# Patient Record
Sex: MALE | Race: BLACK OR AFRICAN AMERICAN | NOT HISPANIC OR LATINO
[De-identification: names, ages, dates, MRNs, and addresses within clinical notes are randomized per-mention and may not be internally consistent; named-entity substitution may affect disease eponyms.]

---
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
## 2019-01-14 ENCOUNTER — APPOINTMENT (OUTPATIENT)
Dept: PEDIATRIC ADOLESCENT MEDICINE | Facility: HOSPITAL | Age: 19
End: 2019-01-14

## 2019-03-01 ENCOUNTER — OUTPATIENT (OUTPATIENT)
Dept: OUTPATIENT SERVICES | Facility: HOSPITAL | Age: 19
LOS: 1 days | End: 2019-03-01
Payer: MEDICAID

## 2019-03-01 ENCOUNTER — OUTPATIENT (OUTPATIENT)
Dept: OUTPATIENT SERVICES | Facility: HOSPITAL | Age: 19
LOS: 1 days | End: 2019-03-01

## 2019-03-01 PROCEDURE — G9001: CPT

## 2019-03-28 ENCOUNTER — APPOINTMENT (OUTPATIENT)
Dept: PEDIATRIC ADOLESCENT MEDICINE | Facility: HOSPITAL | Age: 19
End: 2019-03-28
Payer: MEDICAID

## 2019-03-28 ENCOUNTER — OUTPATIENT (OUTPATIENT)
Dept: OUTPATIENT SERVICES | Age: 19
LOS: 1 days | End: 2019-03-28

## 2019-03-28 VITALS — WEIGHT: 315 LBS | DIASTOLIC BLOOD PRESSURE: 74 MMHG | SYSTOLIC BLOOD PRESSURE: 145 MMHG | HEART RATE: 110 BPM

## 2019-03-28 VITALS — HEIGHT: 73.62 IN | WEIGHT: 315 LBS | BODY MASS INDEX: 40.86 KG/M2

## 2019-03-28 VITALS — SYSTOLIC BLOOD PRESSURE: 150 MMHG | DIASTOLIC BLOOD PRESSURE: 100 MMHG

## 2019-03-28 VITALS — SYSTOLIC BLOOD PRESSURE: 128 MMHG | DIASTOLIC BLOOD PRESSURE: 78 MMHG

## 2019-03-28 DIAGNOSIS — E66.9 OBESITY, UNSPECIFIED: ICD-10-CM

## 2019-03-28 DIAGNOSIS — Z86.39 PERSONAL HISTORY OF OTHER ENDOCRINE, NUTRITIONAL AND METABOLIC DISEASE: ICD-10-CM

## 2019-03-28 PROCEDURE — 99214 OFFICE O/P EST MOD 30 MIN: CPT

## 2019-03-29 DIAGNOSIS — Z71.89 OTHER SPECIFIED COUNSELING: ICD-10-CM

## 2019-03-29 PROBLEM — E66.9 OBESITY, CLASS II, BMI 35-39.9: Status: RESOLVED | Noted: 2017-11-13 | Resolved: 2019-03-29

## 2019-03-29 PROBLEM — Z86.39 HISTORY OF OBESITY: Status: RESOLVED | Noted: 2018-01-24 | Resolved: 2019-03-29

## 2019-03-29 PROBLEM — E66.9 OBESITY PEDS (BMI >=95 PERCENTILE): Status: RESOLVED | Noted: 2017-12-07 | Resolved: 2019-03-29

## 2019-04-08 ENCOUNTER — APPOINTMENT (OUTPATIENT)
Dept: SURGERY | Facility: CLINIC | Age: 19
End: 2019-04-08
Payer: MEDICAID

## 2019-04-08 VITALS
DIASTOLIC BLOOD PRESSURE: 88 MMHG | TEMPERATURE: 98.8 F | SYSTOLIC BLOOD PRESSURE: 144 MMHG | RESPIRATION RATE: 16 BRPM | OXYGEN SATURATION: 97 % | HEART RATE: 79 BPM | BODY MASS INDEX: 41.75 KG/M2 | HEIGHT: 73 IN | WEIGHT: 315 LBS

## 2019-04-08 PROCEDURE — 99214 OFFICE O/P EST MOD 30 MIN: CPT

## 2019-04-08 RX ORDER — KETOCONAZOLE 20.5 MG/ML
2 SHAMPOO, SUSPENSION TOPICAL DAILY
Qty: 1 | Refills: 1 | Status: DISCONTINUED | COMMUNITY
Start: 2017-12-07 | End: 2019-04-08

## 2019-04-08 NOTE — CONSULT LETTER
[Dear  ___] : Dear  [unfilled], [Consult Letter:] : I had the pleasure of evaluating your patient, [unfilled]. [Please see my note below.] : Please see my note below. [Consult Closing:] : Thank you very much for allowing me to participate in the care of this patient.  If you have any questions, please do not hesitate to contact me. [Sincerely,] : Sincerely, [FreeTextEntry3] : Alec Lanier MD, FACS, FASMBS\par Chief Division of Minimally Invasive Surgery\par Co-Director of Bariatric Surgery \par [facility]\[place] [address]

## 2019-04-08 NOTE — ASSESSMENT
[FreeTextEntry1] : 18-year-old male, here for evaluation for a sleeve gastrectomy. We have discussed the procedure. But he is once again, nervous about the operation, and does not think he wants to proceed. I discussed with him the need for surgery given his weight. I have offered to see him at any time when he finally decided that he would like to proceed. All of his questions were answered.

## 2019-04-08 NOTE — HISTORY OF PRESENT ILLNESS
[de-identified] : Boris is a 19 y/o male here for re-consult for weight loss surgery. 18-year-old male, here for evaluation for a sleeve gastrectomy. He's been heavy most of his adult life. He's been on multiple diet programs in the past, losing up to 20 pounds at any one time. He is looking for more permanent solution to his weight loss. Problem. His extended in a sleeve gastrectomy. He was seen over a year ago for the same but canceled at the last minute.

## 2019-04-08 NOTE — PHYSICAL EXAM
[Obese, well nourished, in no acute distress] : obese, well nourished, in no acute distress [Normal] : affect appropriate [de-identified] : Wears glasses [de-identified] : Resp. Effort

## 2019-04-08 NOTE — REASON FOR VISIT
[Morbid Obesity (BMI>40)] : morbid obesity (bmi>40) [FreeTextEntry2] : chief complaint morbid obesity

## 2019-05-23 ENCOUNTER — APPOINTMENT (OUTPATIENT)
Dept: PEDIATRIC ADOLESCENT MEDICINE | Facility: HOSPITAL | Age: 19
End: 2019-05-23

## 2019-06-04 ENCOUNTER — APPOINTMENT (OUTPATIENT)
Dept: PEDIATRIC ADOLESCENT MEDICINE | Facility: HOSPITAL | Age: 19
End: 2019-06-04
Payer: MEDICAID

## 2019-06-04 VITALS
HEIGHT: 73.23 IN | BODY MASS INDEX: 41.3 KG/M2 | SYSTOLIC BLOOD PRESSURE: 131 MMHG | WEIGHT: 315 LBS | HEART RATE: 71 BPM | DIASTOLIC BLOOD PRESSURE: 68 MMHG

## 2019-06-04 PROCEDURE — 99395 PREV VISIT EST AGE 18-39: CPT

## 2019-06-04 NOTE — PHYSICAL EXAM
[Alert] : alert [No Acute Distress] : no acute distress [Normocephalic] : normocephalic [Clear tympanic membranes with bony landmarks and light reflex present bilaterally] : clear tympanic membranes with bony landmarks and light reflex present bilaterally  [EOMI Bilateral] : EOMI bilateral [Pink Nasal Mucosa] : pink nasal mucosa [Nonerythematous Oropharynx] : nonerythematous oropharynx [Supple, full passive range of motion] : supple, full passive range of motion [Clear to Ausculatation Bilaterally] : clear to auscultation bilaterally [No Palpable Masses] : no palpable masses [Regular Rate and Rhythm] : regular rate and rhythm [Normal S1, S2 audible] : normal S1, S2 audible [No Murmurs] : no murmurs [+2 Femoral Pulses] : +2 femoral pulses [Soft] : soft [NonTender] : non tender [Non Distended] : non distended [No Hepatomegaly] : no hepatomegaly [Normoactive Bowel Sounds] : normoactive bowel sounds [No Splenomegaly] : no splenomegaly [Circumcised] : circumcised [Bilateral descended testes] : bilateral descended testes [No Testicular Masses] : no testicular masses [Normal Muscle Tone] : normal muscle tone [No Abnormal Lymph Nodes Palpated] : no abnormal lymph nodes palpated [No Gait Asymmetry] : no gait asymmetry [No pain or deformities with palpation of bone, muscles, joints] : no pain or deformities with palpation of bone, muscles, joints [Straight] : straight [+2 Patella DTR] : +2 patella DTR [Cranial Nerves Grossly Intact] : cranial nerves grossly intact [de-identified] : hyperpigmented plaque on neck and body folds

## 2019-06-04 NOTE — HISTORY OF PRESENT ILLNESS
[Yes] : Patient goes to dentist yearly [Eats meals with family] : eats meals with family [Exposure to drugs] : exposure to drugs [Exposure to alcohol] : exposure to alcohol [No] : No cigarette smoke exposure [Has peer relationships free of violence] : has peer relationships free of violence [With Teen] : teen [Has friends] : has friends [At least 1 hour of physical activity a day] : at least 1 hour of physical activity a day [Needs Immunizations] : needs immunizations [Drinks non-sweetened liquids] : does not drink non-sweetened liquids  [Eats regular meals including adequate fruits and vegetables] : does not eat regular meals including adequate fruits and vegetables [Exposure to electronic nicotine delivery system] : no exposure to electronic nicotine delivery system [Uses electronic nicotine delivery system] : does not use electronic nicotine delivery system [Uses tobacco] : does not use tobacco [Exposure to tobacco] : no exposure to tobacco [Uses drugs] : does not use drugs  [Uses safety belts/safety equipment] : does not use safety belts/safety equipment  [Drinks alcohol] : does not drink alcohol [Has problems with sleep] : does not have problems with sleep [Has thought about hurting self or considered suicide] : has not thought about hurting self or considered suicide [Gets depressed, anxious, or irritable/has mood swings] : does not get depressed, anxious, or irritable/has mood swings [de-identified] : need TB screening for work [FreeTextEntry7] : waiting for new glasses [de-identified] : mother declined on vaccines today: HPV and Hep A series [de-identified] : Starting this fall at [employer] in [place] [de-identified] : live with mother, mother does not always cook dinner [FreeTextEntry1] : Boris is a 18 year old male with history acanthosis, nigricans, hypertension and vitamin D deficiency here for annual PE and TB screening for working papers.  \par \par Evaluated by Bariatric, patient decided not to move forward with surgery \par Since last visit with [facility], he has not been eating "outside food" as much however has not made followup appointment with [facility] or therapist. He reports mother is trying to get him to see a therapist near his house but refuses to go\par \par Total lifetime partners:  4 females\par Current partners: 0\par Last SA:  1 month ago, receptive oral sex \par Denies STI history. HIV tested a year ago\par Denies any painful urination, penile discharge or lesions/mass\par \par  [de-identified] : plays basketball with friends, starting work with disabled adult

## 2019-06-04 NOTE — DISCUSSION/SUMMARY
[FreeTextEntry1] : Boris is a 18 year old male with history acanthosis, nigricans, hypertension and vitamin D deficiency here for annual PE and TB screening for working papers. Mother not present during visit today, reviewed labs with patient: Vit D 12, triglyceride 140, HgbA1c 5.8 (copy of lab results provided for mother)\par \par Plan:\par - Start Vit D 50,000iu Q week x 8 weeks\par - Mother declined HPV and Hep A series today\par - Declined STI testing \par - Lab: Quant TB for work\par - Encouraged to make FU appointment with [employer]. Discussed eliminating sugary drinks, snack, junk food and limit CHO replace with grains/complex CHO\par - Encourage to return for therapy with [place] or with local mental health \par - FU annually for PE

## 2019-06-06 LAB
M TB IFN-G BLD-IMP: NEGATIVE
QUANTIFERON TB PLUS MITOGEN MINUS NIL: 8.8 IU/ML
QUANTIFERON TB PLUS NIL: 0.01 IU/ML
QUANTIFERON TB PLUS TB1 MINUS NIL: 0 IU/ML
QUANTIFERON TB PLUS TB2 MINUS NIL: 0.01 IU/ML

## 2020-11-10 ENCOUNTER — APPOINTMENT (OUTPATIENT)
Dept: PEDIATRIC ADOLESCENT MEDICINE | Facility: HOSPITAL | Age: 20
End: 2020-11-10
Payer: MEDICAID

## 2020-11-10 VITALS
DIASTOLIC BLOOD PRESSURE: 81 MMHG | WEIGHT: 315 LBS | HEIGHT: 73.43 IN | HEART RATE: 72 BPM | BODY MASS INDEX: 40.86 KG/M2 | SYSTOLIC BLOOD PRESSURE: 134 MMHG

## 2020-11-10 DIAGNOSIS — L83 ACANTHOSIS NIGRICANS: ICD-10-CM

## 2020-11-10 PROCEDURE — 99395 PREV VISIT EST AGE 18-39: CPT | Mod: 25

## 2020-11-10 PROCEDURE — 81003 URINALYSIS AUTO W/O SCOPE: CPT | Mod: QW

## 2020-11-10 PROCEDURE — 96160 PT-FOCUSED HLTH RISK ASSMT: CPT

## 2020-11-10 NOTE — DISCUSSION/SUMMARY
[FreeTextEntry1] : Boris is a 18yo male with history of depression, morbid obesity and vitamin D deficiency here for annual PE. \par \par BMI 50.5/>99%\par POCT UA: glucose and ketone neg\par \par Plan:\par - Declined flu vaccine\par - Fasting lab today: CBC, CMP, lipid panel, A1c, HIV, syphilis, GC/CL, COVID antibody\par - Discussed 5-2-1-0, healthier food choices, avoid sugar drinks, fast food or take out, increase physical activity/limit sedentary lifestyle and screen time\par - Refer to Pulmonology for snoring vs FARIDEH\par - Refer to Cardiology for chest pain and morbid obesity\par - Refer Orthopedic for left shoulder injury\par - FU annually for PE, sooner pending lab\par \par

## 2020-11-10 NOTE — HISTORY OF PRESENT ILLNESS
[Eats meals with family] : eats meals with family [Eats regular meals including adequate fruits and vegetables] : eats regular meals including adequate fruits and vegetables [Drinks non-sweetened liquids] : drinks non-sweetened liquids  [Has friends] : has friends [At least 1 hour of physical activity a day] : at least 1 hour of physical activity a day [Uses drugs] : uses drugs  [Drinks alcohol] : drinks alcohol [No] : No cigarette smoke exposure [Uses safety belts/safety equipment] : uses safety belts/safety equipment  [Has peer relationships free of violence] : has peer relationships free of violence [Has family members/adults to turn to for help] : has family members/adults to turn to for help [Is permitted and is able to make independent decisions] : Is permitted and is able to make independent decisions [Has interests/participates in community activities/volunteers] : has interests/participates in community activities/volunteers. [Yes] : Patient has had sexual intercourse. [Has ways to cope with stress] : has ways to cope with stress [Displays self-confidence] : displays self-confidence [Has problems with sleep] : has problems with sleep [With Teen] : teen [Sleep Concerns] : no sleep concerns [Uses electronic nicotine delivery system] : does not use electronic nicotine delivery system [Exposure to electronic nicotine delivery system] : no exposure to electronic nicotine delivery system [Uses tobacco] : does not use tobacco [Exposure to tobacco] : no exposure to tobacco [Exposure to drugs] : no exposure to drugs [Exposure to alcohol] : no exposure to alcohol [Impaired/distracted driving] : no impaired/distracted driving [Gets depressed, anxious, or irritable/has mood swings] : does not get depressed, anxious, or irritable/has mood swings [Has thought about hurting self or considered suicide] : has not thought about hurting self or considered suicide [de-identified] : see HPI [de-identified] : declined flu vaccine [de-identified] : lives mother and brother and sister [de-identified] : [employer] - wants to study criminal justice  [de-identified] : has a  license, works [employer]  [de-identified] : smokes marijuana and drinks sometimes but does not drive while impaired [FreeTextEntry1] : Boris is a 18yo male with history of depression, morbid obesity and vitamin D deficiency here for annual PE. \par \par Since last PE, a year ago, denies hospitalizations, COVID exposure/symptoms, travel outside [facility]/[place].\par \par 11/7/2020 went to [facility] for left chest pain EKG reported normal and CXR normal (see scanned report), and was recommend cardiology evaluation. Patient report on 11/6/2020 while playing basketball he reached out to get the ball and heard a sound in his left shoulder with radiating pain to neck, chest and back pain pain scale 5-6/10. Now has pain left side and shoulder when he moves his left arm. Mother is giving him pain medicine but does not remember the name, icing and sleeping in recliner chair due to pain. \par \par Admits has not been taking vitamin D supplements because he did not know his level is low. Currently taking MVI chewable \par Decided not to have bariatric surgery \par Stopped attending weight management [facility] - did not feel it was helpful. He admits that he gain more weight in the summer after COVID, highest weight 397lbs in 6/2020 but has been trying to work out daily at home and eating more vegetables and protein. He allows himself to have 1 "cheat day" \par Still snores a lot at night \par Attended therapy for 3 months but told by therapist he does not need it \par \par Total lifetime partners: 3 female \par Current partners: 0\par Last SA:  months ago \par STI history - HIV 1-2 years ago negative\par Denies any painful urination, penile discharge or lesions/mass

## 2020-11-10 NOTE — PHYSICAL EXAM

## 2020-11-11 LAB
25(OH)D3 SERPL-MCNC: 22.9 NG/ML
ALBUMIN SERPL ELPH-MCNC: 4.8 G/DL
ALP BLD-CCNC: 85 U/L
ALT SERPL-CCNC: 18 U/L
ANION GAP SERPL CALC-SCNC: 17 MMOL/L
AST SERPL-CCNC: 16 U/L
BASOPHILS # BLD AUTO: 0.02 K/UL
BASOPHILS NFR BLD AUTO: 0.3 %
BILIRUB SERPL-MCNC: 0.4 MG/DL
BUN SERPL-MCNC: 11 MG/DL
CALCIUM SERPL-MCNC: 9.9 MG/DL
CHLORIDE SERPL-SCNC: 98 MMOL/L
CHOLEST SERPL-MCNC: 137 MG/DL
CO2 SERPL-SCNC: 25 MMOL/L
CREAT SERPL-MCNC: 1.14 MG/DL
EOSINOPHIL # BLD AUTO: 0.19 K/UL
EOSINOPHIL NFR BLD AUTO: 2.5 %
ESTIMATED AVERAGE GLUCOSE: 117 MG/DL
GLUCOSE SERPL-MCNC: 84 MG/DL
HBA1C MFR BLD HPLC: 5.7 %
HCT VFR BLD CALC: 48.2 %
HDLC SERPL-MCNC: 41 MG/DL
HGB BLD-MCNC: 15.7 G/DL
HIV1+2 AB SPEC QL IA.RAPID: NONREACTIVE
IMM GRANULOCYTES NFR BLD AUTO: 0.4 %
LDLC SERPL CALC-MCNC: 69 MG/DL
LYMPHOCYTES # BLD AUTO: 2.37 K/UL
LYMPHOCYTES NFR BLD AUTO: 30.6 %
MAN DIFF?: NORMAL
MCHC RBC-ENTMCNC: 27.1 PG
MCHC RBC-ENTMCNC: 32.6 GM/DL
MCV RBC AUTO: 83.2 FL
MONOCYTES # BLD AUTO: 0.38 K/UL
MONOCYTES NFR BLD AUTO: 4.9 %
NEUTROPHILS # BLD AUTO: 4.76 K/UL
NEUTROPHILS NFR BLD AUTO: 61.3 %
NONHDLC SERPL-MCNC: 97 MG/DL
PLATELET # BLD AUTO: 248 K/UL
POTASSIUM SERPL-SCNC: 4.2 MMOL/L
PROT SERPL-MCNC: 7 G/DL
RBC # BLD: 5.79 M/UL
RBC # FLD: 13.8 %
SARS-COV-2 IGG SERPL IA-ACNC: 1.65 INDEX
SARS-COV-2 IGG SERPL QL IA: POSITIVE
SODIUM SERPL-SCNC: 140 MMOL/L
TRIGL SERPL-MCNC: 138 MG/DL
WBC # FLD AUTO: 7.75 K/UL

## 2020-11-11 RX ORDER — CHOLECALCIFEROL (VITAMIN D3) 1250 MCG
1.25 MG CAPSULE ORAL
Qty: 8 | Refills: 0 | Status: DISCONTINUED | COMMUNITY
Start: 2019-06-04 | End: 2020-11-11

## 2020-11-12 LAB
C TRACH RRNA SPEC QL NAA+PROBE: NOT DETECTED
N GONORRHOEA RRNA SPEC QL NAA+PROBE: NOT DETECTED
SOURCE AMPLIFICATION: NORMAL
T PALLIDUM AB SER QL IA: NEGATIVE

## 2020-11-17 ENCOUNTER — APPOINTMENT (OUTPATIENT)
Dept: ORTHOPEDIC SURGERY | Facility: CLINIC | Age: 20
End: 2020-11-17
Payer: MEDICAID

## 2020-11-17 PROCEDURE — 73030 X-RAY EXAM OF SHOULDER: CPT | Mod: LT

## 2020-11-17 PROCEDURE — 99203 OFFICE O/P NEW LOW 30 MIN: CPT

## 2020-11-17 NOTE — PHYSICAL EXAM
[de-identified] : Oriented to time, place, person\par Mood: Normal\par Affect: Normal\par Appearance: Obese, no acute distress.\par Gait: Normal\par Assistive Devices: None \par \par Left shoulder exam:\par \par Inspection: No malalignment, No gross deformity to the sternal deformity or pectoral tendon deformity, No atrophy\par Skin: No masses, No lesions\par Neck: Negative Spurling, full ROM, no pain with ROM\par AROM: FF to 180, abduction to 90, ER to 90, IR to mid lumbar\par Painful arc ROM: IR\par Tenderness:+ sternal clavicular joint pain. No bicipital tenderness, no tenderness to greater tuberosity/RTC insertion, no anterior shoulder/lesser tuberosity tenderness.  No tenderness to palpation to thePectoralis tendon insertion.\par Strength: 5/5 ER, 5/5 IR in adduction , 5/5 supraspinatus testing, negative Ozark's test\par AC joint: No TTP/pain with cross arm testing\par Biceps: Speed Negative, Yergason Negative \par Impingement test: Negative Hdez, Negative Neer\par Vasc: 2+ radial pulse \par Stability: Stable \par Neuro: AIN, PIN, Ulnar nerve intact to motor\par Sensation: Intact to light touch throughout  [de-identified] : The following radiographs were ordered and read by me during this patients visit. I reviewed each radiograph in detail with the patient and discussed the findings as highlighted below.\par \par 3 views of left shoulder were obtained today, 11/17/2020, that show no acute fracture or dislocation. There is no glenohumeral and no AC joint degenerative change seen. Type I acromion. There is no significant malalignment. No significant other obvious osseous abnormality, otherwise unremarkable.

## 2020-11-17 NOTE — DISCUSSION/SUMMARY
[de-identified] : 20 y/o male with left shoulder pain. \par \par Presentation is likely consistent with minor pectoralis muscle strain. Clinically, there is no gross deformity to the sternal deformity or pectoral tendon deformity at the humeral attachment.  Patient also has some discomfort at the sternoclavicular joint on the left side.  Concern for possible subluxation with local irritation of the chest wall/pectoralis attachment.  Considering mild injury, without sofya rupture, recommendation is for physical therapy for strengthening and conditioning. \par \par Recommendations: PT Rx given. Conservative care & observation, this includes rest/activity avoidance until less symptomatic with subsequent gradual return to full activity as tolerated. Patient may also use OTC NSAID's or acetaminophen as tolerated.\par \par Follow-up as needed.

## 2020-11-17 NOTE — HISTORY OF PRESENT ILLNESS
[de-identified] : 19 year old RHD male presents today with left shoulder and chest pain since 11/6/2020. He was playing basketball reached out for a rebound felt a tear in his chest and pain in his shoulder. The pain has improved is brought on with certain movements and leaning on it. Denies numbness or tingling.  Denies any sofya deformity to the left upper extremity.\par \par The patient's past medical history, past surgical history, medications and allergies were reviewed by me today with the patient and documented accordingly. In addition, the patient's family and social history, which were noncontributory to this visit, were reviewed also.

## 2020-11-17 NOTE — ADDENDUM
[FreeTextEntry1] : This note was written by Mariam Clements on 11/17/2020 acting solely as a scribe for Dr. Dangelo Hawkins.\par \par All medical record entries made by the Scribe were at my, Dr. Dangelo Hawkins, direction and personally dictated by me on 11/17/2020. I have personally reviewed the chart and agree that the record accurately reflects my personal performance of the history, physical exam, assessment and plan.

## 2020-11-18 ENCOUNTER — APPOINTMENT (OUTPATIENT)
Dept: PULMONOLOGY | Facility: CLINIC | Age: 20
End: 2020-11-18

## 2020-11-19 ENCOUNTER — APPOINTMENT (OUTPATIENT)
Dept: CARDIOLOGY | Facility: HOSPITAL | Age: 20
End: 2020-11-19

## 2020-11-19 ENCOUNTER — NON-APPOINTMENT (OUTPATIENT)
Age: 20
End: 2020-11-19

## 2020-11-19 VITALS
SYSTOLIC BLOOD PRESSURE: 147 MMHG | OXYGEN SATURATION: 99 % | HEART RATE: 73 BPM | WEIGHT: 315 LBS | HEIGHT: 73.5 IN | BODY MASS INDEX: 40.86 KG/M2 | RESPIRATION RATE: 16 BRPM | DIASTOLIC BLOOD PRESSURE: 91 MMHG

## 2020-11-19 NOTE — HISTORY OF PRESENT ILLNESS
[FreeTextEntry1] : 20 yo man with morbid obesity BMI 49 who is here with L chest pain and L shoulder pain. Pain began abruptly when he was playing basketball and was reaching for the ball. He felt a tearing sensation and since then has been having sharp and aching chest and L shoulder pain, which has been progressively improving. No other symptoms.

## 2020-11-19 NOTE — DISCUSSION/SUMMARY
[FreeTextEntry1] : 20 yo man with morbid obesity BMI 49 who is here with L chest pain and L shoulder pain. Pain began abruptly when he was playing basketball and was reaching for the ball. He felt a tearing sensation and since then has been having sharp and aching chest and L shoulder pain, which has been progressively improving. No other symptoms. \par \par #Reproducible chest pain with inciting injury\par -- Musculoskeletal pain, no cardiac workup needed\par -- EKG reviewed, no ST changes, T wave changes, or pathologic Q waves, or no other significant abnormalities\par \par Discussed with Dr. Nolen\par \par Signed by\par Allen Houston MD\par PGY4 Cardiology

## 2020-11-19 NOTE — PHYSICAL EXAM
[General Appearance - Well Developed] : well developed [Normal Appearance] : normal appearance [Well Groomed] : well groomed [General Appearance - Well Nourished] : well nourished [No Deformities] : no deformities [General Appearance - In No Acute Distress] : no acute distress [Normal Conjunctiva] : the conjunctiva exhibited no abnormalities [Eyelids - No Xanthelasma] : the eyelids demonstrated no xanthelasmas [Normal Oral Mucosa] : normal oral mucosa [No Oral Pallor] : no oral pallor [No Oral Cyanosis] : no oral cyanosis [Normal Jugular Venous A Waves Present] : normal jugular venous A waves present [Normal Jugular Venous V Waves Present] : normal jugular venous V waves present [No Jugular Venous Landry A Waves] : no jugular venous landry A waves [Respiration, Rhythm And Depth] : normal respiratory rhythm and effort [Exaggerated Use Of Accessory Muscles For Inspiration] : no accessory muscle use [Auscultation Breath Sounds / Voice Sounds] : lungs were clear to auscultation bilaterally [Heart Rate And Rhythm] : heart rate and rhythm were normal [Heart Sounds] : normal S1 and S2 [Murmurs] : no murmurs present [Abdomen Soft] : soft [Abdomen Tenderness] : non-tender [Abdomen Mass (___ Cm)] : no abdominal mass palpated [Abnormal Walk] : normal gait [Gait - Sufficient For Exercise Testing] : the gait was sufficient for exercise testing [Nail Clubbing] : no clubbing of the fingernails [Cyanosis, Localized] : no localized cyanosis [Petechial Hemorrhages (___cm)] : no petechial hemorrhages [Skin Color & Pigmentation] : normal skin color and pigmentation [] : no rash [No Venous Stasis] : no venous stasis [Skin Lesions] : no skin lesions [No Skin Ulcers] : no skin ulcer [No Xanthoma] : no  xanthoma was observed [Oriented To Time, Place, And Person] : oriented to person, place, and time [Affect] : the affect was normal [Mood] : the mood was normal [No Anxiety] : not feeling anxious [FreeTextEntry1] : reproducible chest pain

## 2020-12-14 ENCOUNTER — MED ADMIN CHARGE (OUTPATIENT)
Age: 20
End: 2020-12-14

## 2020-12-14 ENCOUNTER — APPOINTMENT (OUTPATIENT)
Dept: PEDIATRIC ADOLESCENT MEDICINE | Facility: HOSPITAL | Age: 20
End: 2020-12-14
Payer: MEDICAID

## 2020-12-14 ENCOUNTER — OUTPATIENT (OUTPATIENT)
Dept: OUTPATIENT SERVICES | Age: 20
LOS: 1 days | End: 2020-12-14

## 2020-12-14 PROCEDURE — 90715 TDAP VACCINE 7 YRS/> IM: CPT | Mod: SL

## 2020-12-14 PROCEDURE — 90472 IMMUNIZATION ADMIN EACH ADD: CPT | Mod: SL

## 2020-12-14 PROCEDURE — 90632 HEPA VACCINE ADULT IM: CPT | Mod: SL

## 2020-12-14 PROCEDURE — 90471 IMMUNIZATION ADMIN: CPT

## 2020-12-14 NOTE — HISTORY OF PRESENT ILLNESS
[Hepatitis A] : Hepatitis A [Tdap] : Tdap [FreeTextEntry1] : HAV #1 and Tdap booster today\par \[facility]\par Pt report he will be living on campus dorm once allowed in spring semester \par - Recommend Bexero is highly recommended

## 2022-03-31 NOTE — HISTORY OF PRESENT ILLNESS
[de-identified] : 21M PMH class III obesity, HTN, depression\par who presents as a new patient for annual physical.\par \par PMH:\par Meds:\par Allergies:\par Surgeries:\par FHx:\par Social Hx: family, work, substance\par \par Immunizations: ?COVID, ?Flu, Tdap (12/2020)\par Depression:\par DAST:\par Labs - CBC, CMP, Lipid, A1c, ?Vit D, ?TSH\par HIV?\par \par Obesity Plan:\par

## 2022-04-01 ENCOUNTER — APPOINTMENT (OUTPATIENT)
Dept: INTERNAL MEDICINE | Facility: CLINIC | Age: 22
End: 2022-04-01

## 2022-07-17 ENCOUNTER — EMERGENCY (EMERGENCY)
Facility: HOSPITAL | Age: 22
LOS: 0 days | Discharge: HOME | End: 2022-07-17
Attending: STUDENT IN AN ORGANIZED HEALTH CARE EDUCATION/TRAINING PROGRAM

## 2022-07-17 VITALS
RESPIRATION RATE: 20 BRPM | TEMPERATURE: 98 F | DIASTOLIC BLOOD PRESSURE: 81 MMHG | HEIGHT: 74 IN | SYSTOLIC BLOOD PRESSURE: 125 MMHG | HEART RATE: 67 BPM | WEIGHT: 315 LBS | OXYGEN SATURATION: 99 %

## 2022-07-17 VITALS
SYSTOLIC BLOOD PRESSURE: 132 MMHG | RESPIRATION RATE: 14 BRPM | DIASTOLIC BLOOD PRESSURE: 58 MMHG | TEMPERATURE: 98 F | HEART RATE: 75 BPM | OXYGEN SATURATION: 100 %

## 2022-07-17 DIAGNOSIS — E66.9 OBESITY, UNSPECIFIED: ICD-10-CM

## 2022-07-17 DIAGNOSIS — R07.89 OTHER CHEST PAIN: ICD-10-CM

## 2022-07-17 DIAGNOSIS — F17.210 NICOTINE DEPENDENCE, CIGARETTES, UNCOMPLICATED: ICD-10-CM

## 2022-07-17 LAB
ALBUMIN SERPL ELPH-MCNC: 3.7 G/DL — SIGNIFICANT CHANGE UP (ref 3.3–5)
ALP SERPL-CCNC: 76 U/L — SIGNIFICANT CHANGE UP (ref 40–120)
ALT FLD-CCNC: 31 U/L — SIGNIFICANT CHANGE UP (ref 12–78)
ANION GAP SERPL CALC-SCNC: 8 MMOL/L — SIGNIFICANT CHANGE UP (ref 5–17)
AST SERPL-CCNC: 22 U/L — SIGNIFICANT CHANGE UP (ref 15–37)
BASOPHILS # BLD AUTO: 0.03 K/UL — SIGNIFICANT CHANGE UP (ref 0–0.2)
BASOPHILS NFR BLD AUTO: 0.4 % — SIGNIFICANT CHANGE UP (ref 0–2)
BILIRUB SERPL-MCNC: 0.3 MG/DL — SIGNIFICANT CHANGE UP (ref 0.2–1.2)
BUN SERPL-MCNC: 12 MG/DL — SIGNIFICANT CHANGE UP (ref 7–23)
CALCIUM SERPL-MCNC: 9 MG/DL — SIGNIFICANT CHANGE UP (ref 8.5–10.1)
CHLORIDE SERPL-SCNC: 108 MMOL/L — SIGNIFICANT CHANGE UP (ref 96–108)
CO2 SERPL-SCNC: 26 MMOL/L — SIGNIFICANT CHANGE UP (ref 22–31)
CREAT SERPL-MCNC: 1.08 MG/DL — SIGNIFICANT CHANGE UP (ref 0.5–1.3)
EGFR: 100 ML/MIN/1.73M2 — SIGNIFICANT CHANGE UP
EOSINOPHIL # BLD AUTO: 0.14 K/UL — SIGNIFICANT CHANGE UP (ref 0–0.5)
EOSINOPHIL NFR BLD AUTO: 1.9 % — SIGNIFICANT CHANGE UP (ref 0–6)
GLUCOSE SERPL-MCNC: 93 MG/DL — SIGNIFICANT CHANGE UP (ref 70–99)
HCT VFR BLD CALC: 46.8 % — SIGNIFICANT CHANGE UP (ref 39–50)
HGB BLD-MCNC: 15.8 G/DL — SIGNIFICANT CHANGE UP (ref 13–17)
IMM GRANULOCYTES NFR BLD AUTO: 0.3 % — SIGNIFICANT CHANGE UP (ref 0–1.5)
LYMPHOCYTES # BLD AUTO: 2.2 K/UL — SIGNIFICANT CHANGE UP (ref 1–3.3)
LYMPHOCYTES # BLD AUTO: 29.5 % — SIGNIFICANT CHANGE UP (ref 13–44)
MCHC RBC-ENTMCNC: 27.2 PG — SIGNIFICANT CHANGE UP (ref 27–34)
MCHC RBC-ENTMCNC: 33.8 G/DL — SIGNIFICANT CHANGE UP (ref 32–36)
MCV RBC AUTO: 80.7 FL — SIGNIFICANT CHANGE UP (ref 80–100)
MONOCYTES # BLD AUTO: 0.31 K/UL — SIGNIFICANT CHANGE UP (ref 0–0.9)
MONOCYTES NFR BLD AUTO: 4.2 % — SIGNIFICANT CHANGE UP (ref 2–14)
NEUTROPHILS # BLD AUTO: 4.75 K/UL — SIGNIFICANT CHANGE UP (ref 1.8–7.4)
NEUTROPHILS NFR BLD AUTO: 63.7 % — SIGNIFICANT CHANGE UP (ref 43–77)
NRBC # BLD: 0 /100 WBCS — SIGNIFICANT CHANGE UP (ref 0–0)
PLATELET # BLD AUTO: 219 K/UL — SIGNIFICANT CHANGE UP (ref 150–400)
POTASSIUM SERPL-MCNC: 3.7 MMOL/L — SIGNIFICANT CHANGE UP (ref 3.5–5.3)
POTASSIUM SERPL-SCNC: 3.7 MMOL/L — SIGNIFICANT CHANGE UP (ref 3.5–5.3)
PROT SERPL-MCNC: 7.1 GM/DL — SIGNIFICANT CHANGE UP (ref 6–8.3)
RBC # BLD: 5.8 M/UL — SIGNIFICANT CHANGE UP (ref 4.2–5.8)
RBC # FLD: 14 % — SIGNIFICANT CHANGE UP (ref 10.3–14.5)
SODIUM SERPL-SCNC: 142 MMOL/L — SIGNIFICANT CHANGE UP (ref 135–145)
TROPONIN I, HIGH SENSITIVITY RESULT: 5.4 NG/L — SIGNIFICANT CHANGE UP
WBC # BLD: 7.45 K/UL — SIGNIFICANT CHANGE UP (ref 3.8–10.5)
WBC # FLD AUTO: 7.45 K/UL — SIGNIFICANT CHANGE UP (ref 3.8–10.5)

## 2022-07-17 PROCEDURE — 71046 X-RAY EXAM CHEST 2 VIEWS: CPT | Mod: 26

## 2022-07-17 PROCEDURE — 99285 EMERGENCY DEPT VISIT HI MDM: CPT

## 2022-07-17 RX ORDER — SODIUM CHLORIDE 9 MG/ML
1000 INJECTION INTRAMUSCULAR; INTRAVENOUS; SUBCUTANEOUS ONCE
Refills: 0 | Status: COMPLETED | OUTPATIENT
Start: 2022-07-17 | End: 2022-07-17

## 2022-07-17 RX ADMIN — SODIUM CHLORIDE 1000 MILLILITER(S): 9 INJECTION INTRAMUSCULAR; INTRAVENOUS; SUBCUTANEOUS at 08:45

## 2022-07-17 NOTE — ED PROVIDER NOTE - CLINICAL SUMMARY MEDICAL DECISION MAKING FREE TEXT BOX
20 y/o M pmhx obesity c/o left sided chest pain since 430am 3 hours PTA in ED. he states that he was getting up to go to the bathroom when he felt a sharp pain in the left side of chest, non-radiating. patient denies sob, cough, fever, chills. he also felt an episode of palpitations at the time. here in the ED he no longer feels the symptoms. patient states that he uses a vape and smokes weed with tobacco. does not currently have a pcp. -- ekg NSR, no active pain. physical exam normal-- will check labs, trop chest xray, fluids

## 2022-07-17 NOTE — ED PROVIDER NOTE - PATIENT PORTAL LINK FT
You can access the FollowMyHealth Patient Portal offered by [facility] by registering at the following website: [email or web address]. By joining [employer]’s FollowMyHealth portal, you will also be able to view your health information using other applications (apps) compatible with our system.

## 2022-07-17 NOTE — ED PROVIDER NOTE - NSFOLLOWUPCLINICS_GEN_ALL_ED_FT
[facility] General Internal Medicine  General Internal Medicine  [address] [place] [address]  Phone: [phone]  Fax:     [facility] [place]  Internal Medicine  [address] [place] [address]  Phone: [phone]  Fax: [phone]

## 2022-07-17 NOTE — ED PROVIDER NOTE - CARE PROVIDER_API CALL
Abhijeet Vivas E  INTERNAL MEDICINE  [address] [place] [address]  Phone: [phone]  Fax: [phone]  Follow Up Time:

## 2022-07-17 NOTE — ED PROVIDER NOTE - OBJECTIVE STATEMENT
20 y/o M pmhx obesity c/o left sided chest pain since 430am 3 hours PTA in ED. he states that he was getting up to go to the bathroom when he felt a sharp pain in the left side of chest, non-radiating. patient denies sob, cough, fever, chills. he also felt an episode of palpitations at the time. here in the ED he no longer feels the symptoms. patient states that he uses a vape and smokes weed with tobacco. does not currently have a pcp.

## 2022-07-17 NOTE — ED ADULT NURSE NOTE - OBJECTIVE STATEMENT
Patient is alert and oriented x4. Came in for left sided chest pain that started this morning. No radiation. Pain score is 3/10. Denies any n/v, dizziness or shortness of breath.

## 2022-07-17 NOTE — ED ADULT TRIAGE NOTE - CHIEF COMPLAINT QUOTE
BIBA for chest pain x 1 hour, states when he got up from bed started to feel pain, denies n/v/dizziness, denies sob

## 2022-07-17 NOTE — ED PROVIDER NOTE - PHYSICAL EXAMINATION
Gen: Awake, Alert, NAD, obese  Head:  NC/AT  Eyes:  PERRL, EOMI, Conjunctiva pink, lids normal, no scleral icterus  ENT: OP clear, no exudates, moist mucus membranes  Neck: supple, nontender, no meningismus, no JVD, trachea midline  Cardiac/CV:  S1 S2, RRR, no M/G/R, no chest wall tenderness  Respiratory/Pulm:  CTAB, good air movement, normal resp effort, no wheezes/stridor/retractions/rales/rhonchi  Gastrointestinal/Abdomen:  Soft, non tender , nondistended, +BS, no rebound/guarding  Back:  no CVAT,  no MLT  Ext:  warm, well perfused, moving all extremities spontaneously, no peripheral edema, distal pulses intact  Skin: intact, no rash  Neuro:  AAOx3, sensation intact, motor 5/5 x 4 extremities, normal gait, speech clear

## 2022-07-17 NOTE — ED PROVIDER NOTE - NSFOLLOWUPINSTRUCTIONS_ED_ALL_ED_FT
Advance activity as tolerated.  Continue all previously prescribed medications as directed unless otherwise instructed.      Follow up with your primary care physician bring copies of your results.      Return to the ER for worsening or persistent symptoms, and/or ANY NEW OR CONCERNING SYMPTOMS. If you have issues obtaining follow up, please call: [phone] to obtain a doctor or specialist who takes your insurance in your area.  You may call [phone] to make an appointment with the internal medicine clinic.     Take Miralax once a day as needed for constipation.

## 2022-07-17 NOTE — ED PROVIDER NOTE - PROGRESS NOTE DETAILS
NP Dottie: patient without further episodes of pain in the ED> labs WNL, chest xray normal. will DC with follow-up

## 2022-07-19 PROBLEM — E66.9 OBESITY, UNSPECIFIED: Chronic | Status: ACTIVE | Noted: 2022-07-17

## 2022-08-10 ENCOUNTER — APPOINTMENT (OUTPATIENT)
Dept: INTERNAL MEDICINE | Facility: CLINIC | Age: 22
End: 2022-08-10

## 2022-08-15 ENCOUNTER — EMERGENCY (EMERGENCY)
Facility: HOSPITAL | Age: 22
LOS: 0 days | Discharge: HOME | End: 2022-08-15
Attending: STUDENT IN AN ORGANIZED HEALTH CARE EDUCATION/TRAINING PROGRAM

## 2022-08-15 VITALS
DIASTOLIC BLOOD PRESSURE: 90 MMHG | TEMPERATURE: 98 F | HEART RATE: 76 BPM | OXYGEN SATURATION: 96 % | RESPIRATION RATE: 17 BRPM | SYSTOLIC BLOOD PRESSURE: 140 MMHG | HEIGHT: 74 IN | WEIGHT: 300.05 LBS

## 2022-08-15 VITALS
OXYGEN SATURATION: 97 % | SYSTOLIC BLOOD PRESSURE: 146 MMHG | RESPIRATION RATE: 18 BRPM | HEART RATE: 74 BPM | DIASTOLIC BLOOD PRESSURE: 74 MMHG | TEMPERATURE: 99 F

## 2022-08-15 DIAGNOSIS — R50.9 FEVER, UNSPECIFIED: ICD-10-CM

## 2022-08-15 DIAGNOSIS — J02.9 ACUTE PHARYNGITIS, UNSPECIFIED: ICD-10-CM

## 2022-08-15 DIAGNOSIS — R53.1 WEAKNESS: ICD-10-CM

## 2022-08-15 DIAGNOSIS — J06.9 ACUTE UPPER RESPIRATORY INFECTION, UNSPECIFIED: ICD-10-CM

## 2022-08-15 DIAGNOSIS — Z20.822 CONTACT WITH AND (SUSPECTED) EXPOSURE TO COVID-19: ICD-10-CM

## 2022-08-15 DIAGNOSIS — E66.9 OBESITY, UNSPECIFIED: ICD-10-CM

## 2022-08-15 LAB
ALBUMIN SERPL ELPH-MCNC: 3.6 G/DL — SIGNIFICANT CHANGE UP (ref 3.3–5)
ALP SERPL-CCNC: 86 U/L — SIGNIFICANT CHANGE UP (ref 40–120)
ALT FLD-CCNC: 34 U/L — SIGNIFICANT CHANGE UP (ref 12–78)
ANION GAP SERPL CALC-SCNC: 4 MMOL/L — LOW (ref 5–17)
AST SERPL-CCNC: 24 U/L — SIGNIFICANT CHANGE UP (ref 15–37)
BASOPHILS # BLD AUTO: 0.02 K/UL — SIGNIFICANT CHANGE UP (ref 0–0.2)
BASOPHILS NFR BLD AUTO: 0.3 % — SIGNIFICANT CHANGE UP (ref 0–2)
BILIRUB SERPL-MCNC: 0.4 MG/DL — SIGNIFICANT CHANGE UP (ref 0.2–1.2)
BUN SERPL-MCNC: 13 MG/DL — SIGNIFICANT CHANGE UP (ref 7–23)
CALCIUM SERPL-MCNC: 8.7 MG/DL — SIGNIFICANT CHANGE UP (ref 8.5–10.1)
CHLORIDE SERPL-SCNC: 110 MMOL/L — HIGH (ref 96–108)
CO2 SERPL-SCNC: 30 MMOL/L — SIGNIFICANT CHANGE UP (ref 22–31)
CREAT SERPL-MCNC: 1.07 MG/DL — SIGNIFICANT CHANGE UP (ref 0.5–1.3)
EGFR: 101 ML/MIN/1.73M2 — SIGNIFICANT CHANGE UP
EOSINOPHIL # BLD AUTO: 0.18 K/UL — SIGNIFICANT CHANGE UP (ref 0–0.5)
EOSINOPHIL NFR BLD AUTO: 2.3 % — SIGNIFICANT CHANGE UP (ref 0–6)
GLUCOSE SERPL-MCNC: 92 MG/DL — SIGNIFICANT CHANGE UP (ref 70–99)
HCT VFR BLD CALC: 44.1 % — SIGNIFICANT CHANGE UP (ref 39–50)
HGB BLD-MCNC: 14.8 G/DL — SIGNIFICANT CHANGE UP (ref 13–17)
IMM GRANULOCYTES NFR BLD AUTO: 0.3 % — SIGNIFICANT CHANGE UP (ref 0–1.5)
LYMPHOCYTES # BLD AUTO: 2.14 K/UL — SIGNIFICANT CHANGE UP (ref 1–3.3)
LYMPHOCYTES # BLD AUTO: 27.7 % — SIGNIFICANT CHANGE UP (ref 13–44)
MCHC RBC-ENTMCNC: 27.5 PG — SIGNIFICANT CHANGE UP (ref 27–34)
MCHC RBC-ENTMCNC: 33.6 G/DL — SIGNIFICANT CHANGE UP (ref 32–36)
MCV RBC AUTO: 82 FL — SIGNIFICANT CHANGE UP (ref 80–100)
MONOCYTES # BLD AUTO: 0.63 K/UL — SIGNIFICANT CHANGE UP (ref 0–0.9)
MONOCYTES NFR BLD AUTO: 8.2 % — SIGNIFICANT CHANGE UP (ref 2–14)
NEUTROPHILS # BLD AUTO: 4.74 K/UL — SIGNIFICANT CHANGE UP (ref 1.8–7.4)
NEUTROPHILS NFR BLD AUTO: 61.2 % — SIGNIFICANT CHANGE UP (ref 43–77)
NRBC # BLD: 0 /100 WBCS — SIGNIFICANT CHANGE UP (ref 0–0)
PLATELET # BLD AUTO: 229 K/UL — SIGNIFICANT CHANGE UP (ref 150–400)
POTASSIUM SERPL-MCNC: 3.6 MMOL/L — SIGNIFICANT CHANGE UP (ref 3.5–5.3)
POTASSIUM SERPL-SCNC: 3.6 MMOL/L — SIGNIFICANT CHANGE UP (ref 3.5–5.3)
PROT SERPL-MCNC: 7 GM/DL — SIGNIFICANT CHANGE UP (ref 6–8.3)
RAPID RVP RESULT: DETECTED
RBC # BLD: 5.38 M/UL — SIGNIFICANT CHANGE UP (ref 4.2–5.8)
RBC # FLD: 13.7 % — SIGNIFICANT CHANGE UP (ref 10.3–14.5)
RV+EV RNA SPEC QL NAA+PROBE: DETECTED
SARS-COV-2 RNA SPEC QL NAA+PROBE: SIGNIFICANT CHANGE UP
SODIUM SERPL-SCNC: 144 MMOL/L — SIGNIFICANT CHANGE UP (ref 135–145)
WBC # BLD: 7.73 K/UL — SIGNIFICANT CHANGE UP (ref 3.8–10.5)
WBC # FLD AUTO: 7.73 K/UL — SIGNIFICANT CHANGE UP (ref 3.8–10.5)

## 2022-08-15 PROCEDURE — 99284 EMERGENCY DEPT VISIT MOD MDM: CPT

## 2022-08-15 PROCEDURE — 71045 X-RAY EXAM CHEST 1 VIEW: CPT | Mod: 26

## 2022-08-15 RX ORDER — SODIUM CHLORIDE 9 MG/ML
1000 INJECTION INTRAMUSCULAR; INTRAVENOUS; SUBCUTANEOUS ONCE
Refills: 0 | Status: COMPLETED | OUTPATIENT
Start: 2022-08-15 | End: 2022-08-15

## 2022-08-15 RX ORDER — ACETAMINOPHEN 500 MG
650 TABLET ORAL ONCE
Refills: 0 | Status: COMPLETED | OUTPATIENT
Start: 2022-08-15 | End: 2022-08-15

## 2022-08-15 RX ADMIN — SODIUM CHLORIDE 1000 MILLILITER(S): 9 INJECTION INTRAMUSCULAR; INTRAVENOUS; SUBCUTANEOUS at 08:01

## 2022-08-15 RX ADMIN — Medication 650 MILLIGRAM(S): at 08:01

## 2022-08-15 NOTE — ED PROVIDER NOTE - CLINICAL SUMMARY MEDICAL DECISION MAKING FREE TEXT BOX
22 y/o M with PMH obesity presenting to the ED with viral-type symptoms. Vitals stable. He appears audibly congested. In setting of weakness, will dose fluids and check basic labs, check RVP and CXR. Treat with tylenol. Reassess.

## 2022-08-15 NOTE — ED ADULT NURSE NOTE - OBJECTIVE STATEMENT
Patient alert and oriented X 4, sates Saturday he went to urgent care for a sore throat, as per patient " my throat was swollen too." states he took a rapid and a PCR test, rapid was negative and PCR was never resulted. Patient states he is feeling worse now with fever's and body aches. and decided to come to ED. Denies PMHX

## 2022-08-15 NOTE — ED PROVIDER NOTE - PHYSICAL EXAMINATION
GENERAL: Awake, alert, NAD, obese  HEENT: NC/AT, dry mucous membranes, +audible congestion  LUNGS: CTAB, no wheezes or crackles   CARDIAC: RRR, no m/r/g  ABDOMEN: Soft, normal BS, non tender, non distended, no rebound, no guarding  EXT: No edema, no calf tenderness, 2+ DP pulses bilaterally, no deformities.  NEURO: A&Ox3. Moving all extremities.  SKIN: Warm and dry. No rash.  PSYCH: Normal affect.

## 2022-08-15 NOTE — ED PROVIDER NOTE - NSFOLLOWUPINSTRUCTIONS_ED_ALL_ED_FT
You were seen in the ED and diagnosed with a viral syndrome.    Your viral panel showed entero/rhino virus which is the common cold.    Drink plenty of fluids and get rest.    You can use tylenol or motrin every 6 hours as needed for pain or fever.    A viral respiratory infection is an illness that affects parts of the body used for breathing, like the lungs, nose, and throat. It is caused by a germ called a virus. Symptoms can include runny nose, coughing, sneezing, fatigue, body aches, sore throat, fever, or headache. Over the counter medicine can be used to manage the symptoms but the infection typically goes away on its own in 5 to 10 days.     SEEK IMMEDIATE MEDICAL CARE IF YOU HAVE ANY OF THE FOLLOWING SYMPTOMS: shortness of breath, chest pain, fever over 10 days, or lightheadedness/dizziness.

## 2022-08-15 NOTE — ED PROVIDER NOTE - NS ED ROS FT
CONST: + fevers, +chills  EYES: no pain, no vision changes  ENT: + sore throat, no ear pain, no change in hearing, +congestion  CV: no chest pain, no leg swelling  RESP: no shortness of breath, no cough  ABD: no abdominal pain, no nausea, no vomiting, no diarrhea  : no dysuria, no flank pain, no hematuria  MSK: no back pain, no extremity pain  NEURO: no headache or additional neurologic complaints  HEME: no easy bleeding  SKIN:  no rash

## 2022-08-15 NOTE — ED PROVIDER NOTE - PATIENT PORTAL LINK FT
You can access the FollowMyHealth Patient Portal offered by [facility] by registering at the following website: [email or web address]. By joining [facility]’s FollowMyHealth portal, you will also be able to view your health information using other applications (apps) compatible with our system.

## 2022-08-15 NOTE — ED PROVIDER NOTE - OBJECTIVE STATEMENT
20 y/o M with PMH obesity presenting to the ED with weakness. Patient reports he has been feeling ill for the past 3 days. Endorses fever, chills, sore throat, congestion at home. Now states feeling generalized weakness. Today his temperature was 100.4F at home. Reports feeling congested and weak today. No CP, SOB, N/V. No diarrhea or urinary complaints. Reports he tested negative for COVID at home.

## 2022-08-15 NOTE — ED ADULT TRIAGE NOTE - CHIEF COMPLAINT QUOTE
sore troat Friday , general weakness for few days , august 13 rapid was negative today was having fever 100.8 at home.

## 2022-08-15 NOTE — ED ADULT NURSE NOTE - NS ED NURSE LEVEL OF CONSCIOUSNESS ORIENTATION
Dr. Lul Carlos reviewed ekg 6/1/2017 and cleared for surgery. Oriented - self; Oriented - place; Oriented - time

## 2022-09-07 ENCOUNTER — APPOINTMENT (OUTPATIENT)
Dept: INTERNAL MEDICINE | Facility: CLINIC | Age: 22
End: 2022-09-07

## 2022-09-07 ENCOUNTER — OUTPATIENT (OUTPATIENT)
Dept: OUTPATIENT SERVICES | Facility: HOSPITAL | Age: 22
LOS: 1 days | End: 2022-09-07
Payer: MEDICAID

## 2022-09-07 VITALS — DIASTOLIC BLOOD PRESSURE: 64 MMHG | SYSTOLIC BLOOD PRESSURE: 118 MMHG

## 2022-09-07 VITALS
SYSTOLIC BLOOD PRESSURE: 136 MMHG | DIASTOLIC BLOOD PRESSURE: 68 MMHG | HEIGHT: 77 IN | OXYGEN SATURATION: 99 % | HEART RATE: 75 BPM

## 2022-09-07 DIAGNOSIS — Z91.51 PERSONAL HISTORY OF SUICIDAL BEHAVIOR: ICD-10-CM

## 2022-09-07 DIAGNOSIS — Z11.3 ENCOUNTER FOR SCREENING FOR INFECTIONS WITH A PREDOMINANTLY SEXUAL MODE OF TRANSMISSION: ICD-10-CM

## 2022-09-07 DIAGNOSIS — Z86.59 PERSONAL HISTORY OF OTHER MENTAL AND BEHAVIORAL DISORDERS: ICD-10-CM

## 2022-09-07 DIAGNOSIS — I10 ESSENTIAL (PRIMARY) HYPERTENSION: ICD-10-CM

## 2022-09-07 DIAGNOSIS — E66.01 MORBID (SEVERE) OBESITY DUE TO EXCESS CALORIES: ICD-10-CM

## 2022-09-07 DIAGNOSIS — F43.21 ADJUSTMENT DISORDER WITH DEPRESSED MOOD: ICD-10-CM

## 2022-09-07 DIAGNOSIS — S29.011A STRAIN OF MUSCLE AND TENDON OF FRONT WALL OF THORAX, INITIAL ENCOUNTER: ICD-10-CM

## 2022-09-07 DIAGNOSIS — Z86.79 PERSONAL HISTORY OF OTHER DISEASES OF THE CIRCULATORY SYSTEM: ICD-10-CM

## 2022-09-07 PROCEDURE — 82306 VITAMIN D 25 HYDROXY: CPT

## 2022-09-07 PROCEDURE — 80053 COMPREHEN METABOLIC PANEL: CPT

## 2022-09-07 PROCEDURE — G0463: CPT

## 2022-09-07 PROCEDURE — 85025 COMPLETE CBC W/AUTO DIFF WBC: CPT

## 2022-09-07 PROCEDURE — 99385 PREV VISIT NEW AGE 18-39: CPT | Mod: GC

## 2022-09-07 PROCEDURE — 87389 HIV-1 AG W/HIV-1&-2 AB AG IA: CPT

## 2022-09-07 PROCEDURE — 80061 LIPID PANEL: CPT

## 2022-09-07 PROCEDURE — 86803 HEPATITIS C AB TEST: CPT

## 2022-09-07 PROCEDURE — 83036 HEMOGLOBIN GLYCOSYLATED A1C: CPT

## 2022-09-07 PROCEDURE — 86780 TREPONEMA PALLIDUM: CPT

## 2022-09-07 NOTE — HISTORY OF PRESENT ILLNESS
[FreeTextEntry1] : new patient establish PCP [de-identified] : Mr. Sánchez is a 20 yo M with PMH of morbid obesity BMI 49.8, pre-diabetes, here today to establish care with new adult PCP. \par \par Pt reports he had a recent sexual encounter with a girl about 1 month ago and he recently found out she tested positive for Chlamydia. He reports he did not wear a condom for the entire counter, engaged in vaginal sex. Pt reports a sensation of feeling a pulsating burning towards the tip of his penis for the last 3 weeks. Also reports small nonpainful lump on R testicle that he first noticed 1 week ago, when he tries to move it feels as if it's connected to testicle.  Denies any discharge from the penis, no lumps he noticed in his pubic/inguinal area. Pt requesting STD testing at this time. \par \par Pt offered gastric sleeve in the past by bariatric surgeon, was cleared by cardiology, but then decided against surgery because he was nervous. Pt reports now that he is older, he is more strongly considering the surgery. Pt reports he has been trying various exercise and diet programs with little success. He can lose weight temporarily, but he gains it back. Unable to get weight today in clinic as pt exceeded weight limit of scale, but pt reports last known weight 375-380. 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 Also lives in dorm at [employer] studying crimonology, so he has limited time, and being in the dorm he is tempted to indulge in unhealthy food because he has unlimited access to the cafeteria. \par \par Pt deferring flu shot at this time, recently had flu 1 month ago requriing ED visit. Now recovered.\par \par

## 2022-09-07 NOTE — ASSESSMENT
[FreeTextEntry1] : 20 yo M with PMH of morbid obesity BMI 49.8, pre-diabetes, presenting for new patient CPE. \par \par #STD screening\par #Testicular mass\par Today with 3 weeks meatal burning and 1 week testicular nodule after sexual encounter with a female 1 month ago who tested positive for chlamydia. Pt did not use a condom during part of the encounter. No discharge noted from meatus on exam. Testicular nodule could not be found on exam, no testicular tenderness or obvious abnormalities. No inguinal lymphadenopathy.\par - Gonorrhea, chlamydia, HIV, syphilis, hep C \par - testicular ultrasound\par \par #HCM\par - routine labs CBC, CMP, A1C, lipid\par - vitamin D\par - flu shot deferred at this time\par \par #Morbid obesity\par - given referral for Dr. Lanier bariatric surgeon to reassess for gastric sleeve\par \par #hx of suicide attempt, MDD\par -denied depressive sx today\par -continue to monitor.\par \par Case discussed with Dr. Horowitz\par \par Liam Cortez MD\par PGY1

## 2022-09-07 NOTE — REVIEW OF SYSTEMS
[Negative] : Constitutional [Fever] : no fever [Chills] : no chills [Fatigue] : no fatigue [Dryness] : no dryness [Vision Problems] : no vision problems [Sore Throat] : no sore throat [Chest Pain] : no chest pain [Palpitations] : no palpitations [Orthopena] : no orthopnea [Shortness Of Breath] : no shortness of breath [Wheezing] : no wheezing [Abdominal Pain] : no abdominal pain [Nausea] : no nausea [Constipation] : no constipation [Diarrhea] : no diarrhea [Vomiting] : no vomiting [Heartburn] : no heartburn [Melena] : no melena [Dysuria] : no dysuria [Incontinence] : no incontinence [Hematuria] : no hematuria [Frequency] : no frequency [Headache] : no headache [Memory Loss] : no memory loss [Easy Bruising] : no easy bruising [FreeTextEntry8] : burning sensation at tip of penis

## 2022-09-07 NOTE — PHYSICAL EXAM
[No Acute Distress] : no acute distress [EOMI] : extraocular movements intact [Normal Outer Ear/Nose] : the outer ears and nose were normal in appearance [No JVD] : no jugular venous distention [No Lymphadenopathy] : no lymphadenopathy [No Respiratory Distress] : no respiratory distress  [Clear to Auscultation] : lungs were clear to auscultation bilaterally [Normal] : normal rate, regular rhythm, normal S1 and S2 and no murmur heard [Soft] : abdomen soft [Non Tender] : non-tender [No HSM] : no HSM [Normal Bowel Sounds] : normal bowel sounds [de-identified] : morbidly obese [de-identified] : central adiposity [FreeTextEntry1] : no discharge from meatus, R testicular nodule could not be palpated, no testicular tenderness, no inguinal lymphadenopathy

## 2022-09-07 NOTE — HEALTH RISK ASSESSMENT
[Good] : ~his/her~  mood as  good [2 - 4 times a month (2 pts)] : 2-4 times a month (2 points) [1 or 2 (0 pts)] : 1 or 2 (0 points) [Never (0 pts)] : Never (0 points) [Yes] : In the past 12 months have you used drugs other than those required for medical reasons? Yes [HIV Test offered] : HIV Test offered [Hepatitis C test offered] : Hepatitis C test offered [With Family] : lives with family [Employed] : employed [Student] : student [Single] : single [Sexually Active] : sexually active [High Risk Behavior] : high risk behavior [Fully functional (bathing, dressing, toileting, transferring, walking, feeding)] : Fully functional (bathing, dressing, toileting, transferring, walking, feeding) [Fully functional (using the telephone, shopping, preparing meals, housekeeping, doing laundry, using] : Fully functional and needs no help or supervision to perform IADLs (using the telephone, shopping, preparing meals, housekeeping, doing laundry, using transportation, managing medications and managing finances) [de-identified] : tobacco in marijuana joint [Audit-CScore] : 2 [de-identified] : Marijuana and vaping [FreeTextEntry2] : Studying criminal law, 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

## 2022-09-08 ENCOUNTER — NON-APPOINTMENT (OUTPATIENT)
Age: 22
End: 2022-09-08

## 2022-09-08 LAB
25(OH)D3 SERPL-MCNC: 11.7 NG/ML
ALBUMIN SERPL ELPH-MCNC: 4.7 G/DL
ALP BLD-CCNC: 106 U/L
ALT SERPL-CCNC: 26 U/L
ANION GAP SERPL CALC-SCNC: 12 MMOL/L
AST SERPL-CCNC: 29 U/L
BASOPHILS # BLD AUTO: 0.03 K/UL
BASOPHILS NFR BLD AUTO: 0.3 %
BILIRUB SERPL-MCNC: 0.4 MG/DL
BUN SERPL-MCNC: 13 MG/DL
C TRACH RRNA SPEC QL NAA+PROBE: NOT DETECTED
CALCIUM SERPL-MCNC: 9.5 MG/DL
CHLORIDE SERPL-SCNC: 100 MMOL/L
CHOLEST SERPL-MCNC: 149 MG/DL
CO2 SERPL-SCNC: 28 MMOL/L
CREAT SERPL-MCNC: 1.17 MG/DL
EGFR: 90 ML/MIN/1.73M2
EOSINOPHIL # BLD AUTO: 0.23 K/UL
EOSINOPHIL NFR BLD AUTO: 2.7 %
ESTIMATED AVERAGE GLUCOSE: 111 MG/DL
GLUCOSE SERPL-MCNC: 85 MG/DL
HBA1C MFR BLD HPLC: 5.5 %
HCT VFR BLD CALC: 46.3 %
HCV AB SER QL: NONREACTIVE
HCV S/CO RATIO: 0.08 S/CO
HDLC SERPL-MCNC: 37 MG/DL
HGB BLD-MCNC: 15.4 G/DL
HIV1+2 AB SPEC QL IA.RAPID: NONREACTIVE
IMM GRANULOCYTES NFR BLD AUTO: 0.2 %
LDLC SERPL CALC-MCNC: 60 MG/DL
LYMPHOCYTES # BLD AUTO: 2.75 K/UL
LYMPHOCYTES NFR BLD AUTO: 32 %
MAN DIFF?: NORMAL
MCHC RBC-ENTMCNC: 27.5 PG
MCHC RBC-ENTMCNC: 33.3 GM/DL
MCV RBC AUTO: 82.8 FL
MONOCYTES # BLD AUTO: 0.54 K/UL
MONOCYTES NFR BLD AUTO: 6.3 %
N GONORRHOEA RRNA SPEC QL NAA+PROBE: NOT DETECTED
NEUTROPHILS # BLD AUTO: 5.03 K/UL
NEUTROPHILS NFR BLD AUTO: 58.5 %
NONHDLC SERPL-MCNC: 112 MG/DL
PLATELET # BLD AUTO: 237 K/UL
POTASSIUM SERPL-SCNC: 4.2 MMOL/L
PROT SERPL-MCNC: 7 G/DL
RBC # BLD: 5.59 M/UL
RBC # FLD: 13.6 %
SODIUM SERPL-SCNC: 140 MMOL/L
SOURCE AMPLIFICATION: NORMAL
T PALLIDUM AB SER QL IA: NEGATIVE
TRIGL SERPL-MCNC: 259 MG/DL
WBC # FLD AUTO: 8.6 K/UL

## 2022-09-12 DIAGNOSIS — Z00.00 ENCOUNTER FOR GENERAL ADULT MEDICAL EXAMINATION WITHOUT ABNORMAL FINDINGS: ICD-10-CM

## 2022-12-30 ENCOUNTER — APPOINTMENT (OUTPATIENT)
Dept: INTERNAL MEDICINE | Facility: CLINIC | Age: 22
End: 2022-12-30
Payer: MEDICAID

## 2022-12-30 VITALS
HEIGHT: 75 IN | OXYGEN SATURATION: 97 % | SYSTOLIC BLOOD PRESSURE: 127 MMHG | BODY MASS INDEX: 39.17 KG/M2 | HEART RATE: 67 BPM | TEMPERATURE: 97.3 F | WEIGHT: 315 LBS | DIASTOLIC BLOOD PRESSURE: 75 MMHG

## 2022-12-30 DIAGNOSIS — N50.89 OTHER SPECIFIED DISORDERS OF THE MALE GENITAL ORGANS: ICD-10-CM

## 2022-12-30 DIAGNOSIS — Z11.1 ENCOUNTER FOR SCREENING FOR RESPIRATORY TUBERCULOSIS: ICD-10-CM

## 2022-12-30 DIAGNOSIS — E66.3 OVERWEIGHT: ICD-10-CM

## 2022-12-30 DIAGNOSIS — Z23 ENCOUNTER FOR IMMUNIZATION: ICD-10-CM

## 2022-12-30 DIAGNOSIS — Z00.00 ENCOUNTER FOR GENERAL ADULT MEDICAL EXAMINATION W/OUT ABNORMAL FINDINGS: ICD-10-CM

## 2022-12-30 DIAGNOSIS — F17.200 NICOTINE DEPENDENCE, UNSPECIFIED, UNCOMPLICATED: ICD-10-CM

## 2022-12-30 DIAGNOSIS — M25.571 PAIN IN RIGHT ANKLE AND JOINTS OF RIGHT FOOT: ICD-10-CM

## 2022-12-30 DIAGNOSIS — Z20.822 CONTACT WITH AND (SUSPECTED) EXPOSURE TO COVID-19: ICD-10-CM

## 2022-12-30 DIAGNOSIS — Z71.3 DIETARY COUNSELING AND SURVEILLANCE: ICD-10-CM

## 2022-12-30 DIAGNOSIS — Z86.59 PERSONAL HISTORY OF OTHER MENTAL AND BEHAVIORAL DISORDERS: ICD-10-CM

## 2022-12-30 DIAGNOSIS — B36.0 PITYRIASIS VERSICOLOR: ICD-10-CM

## 2022-12-30 PROCEDURE — 99214 OFFICE O/P EST MOD 30 MIN: CPT

## 2022-12-30 NOTE — HISTORY OF PRESENT ILLNESS
[FreeTextEntry8] : Boris Sánchez is a 23yo M with PMhx of depression, obesity who presents to establish care.\par \par Right ankle pain:\par Twisted ankle 1-2 months ago. Pain is located in the front of ankle but sometimes in the posterior ankle. Worse with walking.\par \par Testicular mass:\par Noted several months ago. Will sometimes feel abnormal sensation in his scrotum if using marijuana and having sexual activty. No other activity produces sensation including urination, erections without sexual activity

## 2022-12-30 NOTE — PHYSICAL EXAM
[No Acute Distress] : no acute distress [Well-Appearing] : well-appearing [Normal Sclera/Conjunctiva] : normal sclera/conjunctiva [PERRL] : pupils equal round and reactive to light [EOMI] : extraocular movements intact [Normal Outer Ear/Nose] : the outer ears and nose were normal in appearance [Normal Oropharynx] : the oropharynx was normal [Normal TMs] : both tympanic membranes were normal [No Respiratory Distress] : no respiratory distress  [No Accessory Muscle Use] : no accessory muscle use [Clear to Auscultation] : lungs were clear to auscultation bilaterally [Normal Rate] : normal rate  [Regular Rhythm] : with a regular rhythm [Normal S1, S2] : normal S1 and S2 [No Murmur] : no murmur heard [Pedal Pulses Present] : the pedal pulses are present [No Edema] : there was no peripheral edema [Soft] : abdomen soft [Non-distended] : non-distended [No Masses] : no abdominal mass palpated [Penis Abnormality] : normal circumcised penis [No CVA Tenderness] : no CVA  tenderness [No Spinal Tenderness] : no spinal tenderness [Grossly Normal Strength/Tone] : grossly normal strength/tone [No Rash] : no rash [Coordination Grossly Intact] : coordination grossly intact [No Focal Deficits] : no focal deficits [Normal Gait] : normal gait [Deep Tendon Reflexes (DTR)] : deep tendon reflexes were 2+ and symmetric [Normal Affect] : the affect was normal [Normal Insight/Judgement] : insight and judgment were intact [de-identified] : Some tenderness in the LUQ [FreeTextEntry1] : Nodule palpated separate from right testis, nontender [de-identified] : Mild tenderness of the medial anterior foot. Pain eilicted w/ eversion, but no pain with plantar/dorsiflexion or inversion

## 2022-12-30 NOTE — HEALTH RISK ASSESSMENT
[Current] : Current [0-4] : 0-4 [2 - 4 times a month (2 pts)] : 2-4 times a month (2 points) [5 or 6 (2 pts)] : 5 or 6 (2  points) [Never (0 pts)] : Never (0 points) [Yes] : In the past 12 months have you used drugs other than those required for medical reasons? Yes [0] : 2) Feeling down, depressed, or hopeless: Not at all (0) [PHQ-2 Negative - No further assessment needed] : PHQ-2 Negative - No further assessment needed [Audit-CScore] : 4 [de-identified] : Marijuana daily [de-identified] : Breakfast: Bagel with strawberry and sausage. Dinner: Macaroni, yam, turkey, stuffing, green beans. Does drink soda, but usually diet. [[id number]] : 0

## 2023-01-23 ENCOUNTER — APPOINTMENT (OUTPATIENT)
Dept: SURGERY | Facility: CLINIC | Age: 23
End: 2023-01-23
Payer: MEDICAID

## 2023-01-23 VITALS
WEIGHT: 315 LBS | DIASTOLIC BLOOD PRESSURE: 84 MMHG | TEMPERATURE: 96 F | RESPIRATION RATE: 18 BRPM | HEIGHT: 74 IN | HEART RATE: 84 BPM | SYSTOLIC BLOOD PRESSURE: 122 MMHG | OXYGEN SATURATION: 96 % | BODY MASS INDEX: 40.43 KG/M2

## 2023-01-23 DIAGNOSIS — Z78.9 OTHER SPECIFIED HEALTH STATUS: ICD-10-CM

## 2023-01-23 PROCEDURE — 99205 OFFICE O/P NEW HI 60 MIN: CPT

## 2023-01-23 NOTE — ASSESSMENT
[FreeTextEntry1] : 22-year-old male 6 foot 2 379 pounds with a BMI of 48.7 he would like to be considered for sleeve gastrectomy I believe he be an excellent candidate he will undergo her usual medical nutritional and psychological work-up attend a preoperative support group meeting and return for second office visit once all these are completed the risk benefits and expectations were discussed at length with the patient all of his questions were answered

## 2023-01-23 NOTE — HISTORY OF PRESENT ILLNESS
[de-identified] :  RICCO PERALES is a 22 year old male here for consultation for weight loss surgery.  22-year-old male 6 foot 2 379 pounds with a BMI of 48.7 he is here for evaluation for weight loss surgery he has been having most of his adult life he has been on multiple diet programs in the past losing up to 30 pounds at any 1 time always gaining that weight back he is looking for more permanent solution to his weight loss problem he is interested in a sleeve gastrectomy.\par

## 2023-01-23 NOTE — REASON FOR VISIT
[Initial Consult] : an initial consult for [Morbid Obesity (BMI>40)] : morbid obesity (bmi>40) [FreeTextEntry2] : Morbid obesity

## 2023-01-30 ENCOUNTER — APPOINTMENT (OUTPATIENT)
Dept: SURGERY | Facility: CLINIC | Age: 23
End: 2023-01-30
Payer: MEDICAID

## 2023-01-30 VITALS
HEIGHT: 74 IN | TEMPERATURE: 96.5 F | SYSTOLIC BLOOD PRESSURE: 156 MMHG | OXYGEN SATURATION: 95 % | WEIGHT: 315 LBS | RESPIRATION RATE: 18 BRPM | BODY MASS INDEX: 40.43 KG/M2 | HEART RATE: 71 BPM | DIASTOLIC BLOOD PRESSURE: 92 MMHG

## 2023-01-30 PROCEDURE — 99214 OFFICE O/P EST MOD 30 MIN: CPT

## 2023-01-30 NOTE — HISTORY OF PRESENT ILLNESS
[de-identified] : RICCO is a 22 year old male here for weight management with NP Keagan Holm.\par \par Is being seen for pre-op bariatric surgery. was interested in wt. loss medications however does not qualify due to insurance exclusions and does not have the funds for out of pocket expenses such as phentermine.  [de-identified] : pre op bariatric surgery work up.

## 2023-01-30 NOTE — ASSESSMENT
Pt called asking to speak with Ailin Vila in regards to his procedure [FreeTextEntry1] : working towards sleeve gastrectomy. Does not qualify for medical therapy due to insurance and OOP costs\par The patient is doing well following our guidelines all of his questions were answered he will follow-up next month\par Alec Lanier MD, FACS, FASMBS\par Chief Division of Minimally Invasive Surgery\par Co-Director of Bariatric Surgery \par [facility]\par [place] [phone]

## 2023-01-30 NOTE — REASON FOR VISIT
[Follow-Up Visit] : a follow-up visit for [Morbid Obesity (BMI>40)] : morbid obesity (bmi>40) [FreeTextEntry2] : Morbid obesity

## 2023-02-08 ENCOUNTER — OUTPATIENT (OUTPATIENT)
Dept: OUTPATIENT SERVICES | Facility: HOSPITAL | Age: 23
LOS: 1 days | End: 2023-02-08
Payer: MEDICAID

## 2023-02-08 ENCOUNTER — APPOINTMENT (OUTPATIENT)
Dept: INTERNAL MEDICINE | Facility: CLINIC | Age: 23
End: 2023-02-08
Payer: MEDICAID

## 2023-02-08 VITALS
HEART RATE: 73 BPM | BODY MASS INDEX: 40.43 KG/M2 | OXYGEN SATURATION: 98 % | HEIGHT: 74 IN | DIASTOLIC BLOOD PRESSURE: 80 MMHG | WEIGHT: 315 LBS | SYSTOLIC BLOOD PRESSURE: 130 MMHG

## 2023-02-08 DIAGNOSIS — Z01.818 ENCOUNTER FOR OTHER PREPROCEDURAL EXAMINATION: ICD-10-CM

## 2023-02-08 DIAGNOSIS — I10 ESSENTIAL (PRIMARY) HYPERTENSION: ICD-10-CM

## 2023-02-08 DIAGNOSIS — E55.9 VITAMIN D DEFICIENCY, UNSPECIFIED: ICD-10-CM

## 2023-02-08 PROCEDURE — 99213 OFFICE O/P EST LOW 20 MIN: CPT | Mod: GE

## 2023-02-08 PROCEDURE — G0463: CPT

## 2023-02-08 RX ORDER — CHOLECALCIFEROL (VITAMIN D3) 1250 MCG
1.25 MG CAPSULE ORAL
Qty: 6 | Refills: 2 | Status: ACTIVE | COMMUNITY
Start: 2023-02-08 | End: 1900-01-01

## 2023-02-09 DIAGNOSIS — E66.01 MORBID (SEVERE) OBESITY DUE TO EXCESS CALORIES: ICD-10-CM

## 2023-02-09 PROBLEM — E55.9 VITAMIN D DEFICIENCY: Status: ACTIVE | Noted: 2017-12-21

## 2023-02-09 NOTE — ASSESSMENT
[FreeTextEntry1] : 23yo M with hx of obesity and Vit D deficiency presenting to clinic requiring referrals for preparation for bariatric surgery\par \par //Obesity\par Follows with Dr. Lanier (Bariatric Surgeon). Planned for possible sleeve gastrectomy\par -He reuqires multiple referrals for preoperative evaluation\par -Cards, Pulm, Diet, GI referrals provided with clinic numbers\par -Medical clearance needed closer to surgical date\par \par \par //Vit D deficiency\par 12/30/22 level 11.7.\par -Needs high dose supplementation\par -Start Vit D 53618 IU q1week x12 weeks given obesity and dark skin\par -Repeat level in 3 months\par \par \par RTC in 3 months for F/u or sooner pending surgical planning\par D/w attending physician, Dr. Naranjo

## 2023-02-09 NOTE — PHYSICAL EXAM
[No Acute Distress] : no acute distress [Well-Appearing] : well-appearing [Normal Sclera/Conjunctiva] : normal sclera/conjunctiva [PERRL] : pupils equal round and reactive to light [EOMI] : extraocular movements intact [Normal Outer Ear/Nose] : the outer ears and nose were normal in appearance [No JVD] : no jugular venous distention [Supple] : supple [No Respiratory Distress] : no respiratory distress  [No Accessory Muscle Use] : no accessory muscle use [Normal Rate] : normal rate  [Regular Rhythm] : with a regular rhythm [Normal S1, S2] : normal S1 and S2 [No Edema] : there was no peripheral edema [No Extremity Clubbing/Cyanosis] : no extremity clubbing/cyanosis [Soft] : abdomen soft [No HSM] : no HSM [Normal Anterior Cervical Nodes] : no anterior cervical lymphadenopathy [No Spinal Tenderness] : no spinal tenderness [Grossly Normal Strength/Tone] : grossly normal strength/tone [No Rash] : no rash [Coordination Grossly Intact] : coordination grossly intact [No Focal Deficits] : no focal deficits [Normal Gait] : normal gait [Normal Affect] : the affect was normal [Normal Insight/Judgement] : insight and judgment were intact [de-identified] : +central adiposity

## 2023-02-09 NOTE — HISTORY OF PRESENT ILLNESS
[Parent] : parent [FreeTextEntry8] : CC: Referrals\par \par 23yo M with hx of obesity and Vit D deficiency presenting to clinic requiring referrals for preparation for bariatric surgery. Patient accompanied by his mother who brings a checklist of items required for surgery; he is planned for possible sleeve gastrectomy with Dr. Lanier. Preoperative evaluations are required per protocol. He denies acute complaints today. Mother reports patient has been able to schedule Psych evaluation as well as scans (UGI series and abdominal sono) required for procedure. They require Cards, GI, Dietitian and Pulmonary evaluation before surgery.

## 2023-02-14 ENCOUNTER — APPOINTMENT (OUTPATIENT)
Dept: ULTRASOUND IMAGING | Facility: CLINIC | Age: 23
End: 2023-02-14
Payer: MEDICAID

## 2023-02-14 ENCOUNTER — OUTPATIENT (OUTPATIENT)
Dept: OUTPATIENT SERVICES | Facility: HOSPITAL | Age: 23
LOS: 1 days | End: 2023-02-14
Payer: MEDICAID

## 2023-02-14 DIAGNOSIS — N50.89 OTHER SPECIFIED DISORDERS OF THE MALE GENITAL ORGANS: ICD-10-CM

## 2023-02-14 DIAGNOSIS — Z00.8 ENCOUNTER FOR OTHER GENERAL EXAMINATION: ICD-10-CM

## 2023-02-14 DIAGNOSIS — Z01.818 ENCOUNTER FOR OTHER PREPROCEDURAL EXAMINATION: ICD-10-CM

## 2023-02-14 PROCEDURE — 76700 US EXAM ABDOM COMPLETE: CPT | Mod: 26

## 2023-02-14 PROCEDURE — 76870 US EXAM SCROTUM: CPT | Mod: 26

## 2023-02-14 PROCEDURE — 76870 US EXAM SCROTUM: CPT

## 2023-02-14 PROCEDURE — 76700 US EXAM ABDOM COMPLETE: CPT

## 2023-02-17 ENCOUNTER — APPOINTMENT (OUTPATIENT)
Dept: SURGERY | Facility: CLINIC | Age: 23
End: 2023-02-17
Payer: MEDICAID

## 2023-02-17 VITALS
RESPIRATION RATE: 17 BRPM | WEIGHT: 315 LBS | BODY MASS INDEX: 57.97 KG/M2 | HEIGHT: 62 IN | OXYGEN SATURATION: 98 % | SYSTOLIC BLOOD PRESSURE: 148 MMHG | DIASTOLIC BLOOD PRESSURE: 90 MMHG | HEART RATE: 74 BPM | TEMPERATURE: 97.7 F

## 2023-02-17 PROCEDURE — 99214 OFFICE O/P EST MOD 30 MIN: CPT

## 2023-02-17 NOTE — ASSESSMENT
[FreeTextEntry1] : 22-year-old male 368 pounds 5 foot 2 with a BMI of 67.4 he is completing his work-up prior to bariatric surgery and following our nutritional and exercise guidelines all of his questions were answered

## 2023-02-17 NOTE — REASON FOR VISIT
[Follow-Up Visit] : a follow-up visit for [Morbid Obesity (BMI>40)] : morbid obesity (bmi>40) [FreeTextEntry2] : Weigh-in

## 2023-02-17 NOTE — HISTORY OF PRESENT ILLNESS
[de-identified] : RICCO is a 22 year old male who presents for weigh-in in preparation for upcoming Bariatric Surgery. \par \par Is being seen for pre-op bariatric surgery. was interested in wt. loss medications however does not qualify due to insurance exclusions and does not have the funds for out of pocket expenses such as phentermine. \par \par Interval History: pre op bariatric surgery work up. \par \par

## 2023-02-21 ENCOUNTER — APPOINTMENT (OUTPATIENT)
Dept: INTERNAL MEDICINE | Facility: CLINIC | Age: 23
End: 2023-02-21
Payer: MEDICAID

## 2023-02-21 ENCOUNTER — OUTPATIENT (OUTPATIENT)
Dept: OUTPATIENT SERVICES | Facility: HOSPITAL | Age: 23
LOS: 1 days | End: 2023-02-21
Payer: MEDICAID

## 2023-02-21 DIAGNOSIS — I10 ESSENTIAL (PRIMARY) HYPERTENSION: ICD-10-CM

## 2023-02-21 PROCEDURE — ZZZZZ: CPT

## 2023-02-21 PROCEDURE — 97802 MEDICAL NUTRITION INDIV IN: CPT

## 2023-02-22 DIAGNOSIS — E66.01 MORBID (SEVERE) OBESITY DUE TO EXCESS CALORIES: ICD-10-CM

## 2023-03-20 ENCOUNTER — APPOINTMENT (OUTPATIENT)
Dept: SURGERY | Facility: CLINIC | Age: 23
End: 2023-03-20

## 2023-03-21 ENCOUNTER — APPOINTMENT (OUTPATIENT)
Dept: PULMONOLOGY | Facility: CLINIC | Age: 23
End: 2023-03-21
Payer: MEDICAID

## 2023-03-21 ENCOUNTER — NON-APPOINTMENT (OUTPATIENT)
Age: 23
End: 2023-03-21

## 2023-03-21 VITALS — DIASTOLIC BLOOD PRESSURE: 82 MMHG | HEART RATE: 65 BPM | OXYGEN SATURATION: 98 % | SYSTOLIC BLOOD PRESSURE: 137 MMHG

## 2023-03-21 DIAGNOSIS — R06.83 SNORING: ICD-10-CM

## 2023-03-21 LAB — POCT - HEMOGLOBIN (HGB), QUANTITATIVE, TRANSCUTANEOUS: 16.7

## 2023-03-21 PROCEDURE — 88738 HGB QUANT TRANSCUTANEOUS: CPT

## 2023-03-21 PROCEDURE — 99204 OFFICE O/P NEW MOD 45 MIN: CPT | Mod: 25

## 2023-03-21 PROCEDURE — 94727 GAS DIL/WSHOT DETER LNG VOL: CPT

## 2023-03-21 PROCEDURE — 94729 DIFFUSING CAPACITY: CPT

## 2023-03-21 PROCEDURE — ZZZZZ: CPT

## 2023-03-21 PROCEDURE — 71046 X-RAY EXAM CHEST 2 VIEWS: CPT

## 2023-03-21 PROCEDURE — 94010 BREATHING CAPACITY TEST: CPT

## 2023-03-21 NOTE — ASSESSMENT
[FreeTextEntry1] : eval for FARIDEH with HST, pulmonary clearance pending review of HST\par \par smoking cessation counseling done\par \par Total encounter time 30 minutes.\par

## 2023-03-21 NOTE — CONSULT LETTER
[FreeTextEntry1] : Dear ,\par \par I had the pleasure of evaluating your patient, RICCO PERALES today in pulmonary consultation.  Please refer to my attached note for my findings and recommendations.\par \par \par Thank you for allowing me to participate in the care of your patient, please feel free to call with any questions or concerns.\par \par \par Sincerely,\par \par Christy Ness MD\par [facility] \par [facility]\par \par

## 2023-03-21 NOTE — REASON FOR VISIT
[Consultation] : a consultation [Sleep Apnea] : sleep apnea [Pre-op Risk Stratification] : pre-op risk stratification

## 2023-03-21 NOTE — HISTORY OF PRESENT ILLNESS
[Never] : never [Current] : current [TextBox_4] : 22M with morbid obesity here for pulmonary clearance prior to bariatric surgery.\par \par Denies medical hx\par no meds\par no hx of asthma/frequent lung infections\par no prior surgeries\par denies sob/cough/wheeze\par reports snoring, never tested for FARIDEH, sometimes tired during the day

## 2023-03-24 ENCOUNTER — APPOINTMENT (OUTPATIENT)
Dept: SURGERY | Facility: CLINIC | Age: 23
End: 2023-03-24
Payer: MEDICAID

## 2023-03-24 VITALS
RESPIRATION RATE: 18 BRPM | HEART RATE: 62 BPM | BODY MASS INDEX: 40.43 KG/M2 | HEIGHT: 74 IN | SYSTOLIC BLOOD PRESSURE: 142 MMHG | DIASTOLIC BLOOD PRESSURE: 85 MMHG | TEMPERATURE: 97 F | OXYGEN SATURATION: 97 % | WEIGHT: 315 LBS

## 2023-03-24 PROCEDURE — 99214 OFFICE O/P EST MOD 30 MIN: CPT

## 2023-03-24 NOTE — HISTORY OF PRESENT ILLNESS
[de-identified] : RICCO is a 22 year old male who presents for weigh-in in preparation for upcoming Bariatric Surgery. \par \par Is being seen for pre-op bariatric surgery. was interested in wt. loss medications however does not qualify due to insurance exclusions and does not have the funds for out of pocket expenses such as phentermine. \par \par Interval History: pre op bariatric surgery work up. \par \par

## 2023-03-24 NOTE — ASSESSMENT
[FreeTextEntry1] : 22-year-old male here for weigh-in prior to sleeve gastrectomy he is 6 foot 2 364 pounds with a BMI of 46.7 he is following our diet and exercise recommendations all of his questions were answered he will follow-up in 1 month

## 2023-03-29 ENCOUNTER — APPOINTMENT (OUTPATIENT)
Dept: GASTROENTEROLOGY | Facility: CLINIC | Age: 23
End: 2023-03-29
Payer: MEDICAID

## 2023-03-29 VITALS
SYSTOLIC BLOOD PRESSURE: 139 MMHG | TEMPERATURE: 97.3 F | BODY MASS INDEX: 40.43 KG/M2 | OXYGEN SATURATION: 97 % | DIASTOLIC BLOOD PRESSURE: 85 MMHG | HEART RATE: 64 BPM | HEIGHT: 74 IN | WEIGHT: 315 LBS

## 2023-03-29 PROCEDURE — 99204 OFFICE O/P NEW MOD 45 MIN: CPT

## 2023-03-29 NOTE — PHYSICAL EXAM
[Alert] : alert [Normal Voice/Communication] : normal voice/communication [Healthy Appearing] : healthy appearing [No Acute Distress] : no acute distress [Sclera] : the sclera and conjunctiva were normal [Hearing Threshold Finger Rub Not [place]] : hearing was normal [Normal Lips/Gums] : the lips and gums were normal [Oropharynx] : the oropharynx was normal [Normal Appearance] : the appearance of the neck was normal [No Neck Mass] : no neck mass was observed [No Respiratory Distress] : no respiratory distress [No Acc Muscle Use] : no accessory muscle use [Respiration, Rhythm And Depth] : normal respiratory rhythm and effort [Auscultation Breath Sounds / Voice Sounds] : lungs were clear to auscultation bilaterally [Heart Rate And Rhythm] : heart rate was normal and rhythm regular [Normal S1, S2] : normal S1 and S2 [Murmurs] : no murmurs [Bowel Sounds] : normal bowel sounds [Abdomen Tenderness] : non-tender [No Masses] : no abdominal mass palpated [Abdomen Soft] : soft [] : no hepatosplenomegaly [No CVA Tenderness] : no CVA  tenderness [No Spinal Tenderness] : no spinal tenderness [Abnormal Walk] : normal gait [Oriented To Time, Place, And Person] : oriented to person, place, and time

## 2023-03-29 NOTE — REASON FOR VISIT
[Initial Evaluation] : an initial evaluation [FreeTextEntry1] : For bariatric surgery, Morbid obesity

## 2023-03-29 NOTE — HISTORY OF PRESENT ILLNESS
[FreeTextEntry1] : Patient is a 22-year-old gentleman who is referred by Dr. Garcia for an upper endoscopy prior to bariatric surgery.\par Patient has no GI complaints.  He has no heartburn or regurgitation.  His bowel movements are regular.  He claims that he had an upper endoscopy recently.

## 2023-03-29 NOTE — ASSESSMENT
[FreeTextEntry1] : Patient with morbid obesity.  He is referred by Dr. Garcia for upper endoscopy and biopsy for H. pylori prior to bariatric surgery.  He has no gastrointestinal complaints.  His bowel movements are regular.  He has no heartburn or regurgitation or dysphagia or early satiety.\par He claims that he had an upper endoscopy recently.  We will try and get a copy of the results.  If we are able to get the upper endoscopy results, a breath test for H. pylori will be done.

## 2023-04-03 ENCOUNTER — NON-APPOINTMENT (OUTPATIENT)
Age: 23
End: 2023-04-03

## 2023-04-03 ENCOUNTER — APPOINTMENT (OUTPATIENT)
Dept: CARDIOLOGY | Facility: CLINIC | Age: 23
End: 2023-04-03
Payer: MEDICAID

## 2023-04-03 VITALS
BODY MASS INDEX: 40.43 KG/M2 | OXYGEN SATURATION: 99 % | DIASTOLIC BLOOD PRESSURE: 86 MMHG | SYSTOLIC BLOOD PRESSURE: 140 MMHG | HEART RATE: 80 BPM | HEIGHT: 74 IN | WEIGHT: 315 LBS

## 2023-04-03 DIAGNOSIS — R73.03 PREDIABETES.: ICD-10-CM

## 2023-04-03 PROCEDURE — 93000 ELECTROCARDIOGRAM COMPLETE: CPT

## 2023-04-03 PROCEDURE — 99203 OFFICE O/P NEW LOW 30 MIN: CPT | Mod: 25

## 2023-04-03 NOTE — DISCUSSION/SUMMARY
[___ Month(s)] : in [unfilled] month(s) [FreeTextEntry1] : Echo and stress test ordered to assess current LV function, valvular status and exercise function to rule out any provocable ischemic or arrhythmic ECG changes.  It looks as though he already has an echo and stress test ordered for next month at a different facility.  He will have this test performed and reviewed.  Pending results and addendum to this note regarding fitness for surgery will be added.

## 2023-04-03 NOTE — HISTORY OF PRESENT ILLNESS
[FreeTextEntry1] : He is a pleasant 22-year-old gentleman with history of BMI of 47 kg per metered squared, prediabetes, comes in for preoperative cardiovascular assessment for upcoming gastric sleeve surgery.  He is going to the difference discipline work-up currently.  He is eating healthier and denies current chest symptoms.  ECG today is within normal limits.  Available medical data reviewed.

## 2023-04-04 ENCOUNTER — APPOINTMENT (OUTPATIENT)
Dept: GASTROENTEROLOGY | Facility: HOSPITAL | Age: 23
End: 2023-04-04

## 2023-04-06 ENCOUNTER — NON-APPOINTMENT (OUTPATIENT)
Age: 23
End: 2023-04-06

## 2023-04-12 ENCOUNTER — APPOINTMENT (OUTPATIENT)
Dept: PULMONOLOGY | Facility: CLINIC | Age: 23
End: 2023-04-12
Payer: MEDICAID

## 2023-04-12 ENCOUNTER — APPOINTMENT (OUTPATIENT)
Dept: INTERNAL MEDICINE | Facility: CLINIC | Age: 23
End: 2023-04-12

## 2023-04-12 PROCEDURE — 95800 SLP STDY UNATTENDED: CPT | Mod: 52

## 2023-04-14 PROCEDURE — 95800 SLP STDY UNATTENDED: CPT

## 2023-05-03 ENCOUNTER — APPOINTMENT (OUTPATIENT)
Dept: PULMONOLOGY | Facility: CLINIC | Age: 23
End: 2023-05-03
Payer: MEDICAID

## 2023-05-03 VITALS — SYSTOLIC BLOOD PRESSURE: 124 MMHG | HEART RATE: 77 BPM | OXYGEN SATURATION: 97 % | DIASTOLIC BLOOD PRESSURE: 63 MMHG

## 2023-05-03 DIAGNOSIS — G47.33 OBSTRUCTIVE SLEEP APNEA (ADULT) (PEDIATRIC): ICD-10-CM

## 2023-05-03 PROCEDURE — 99213 OFFICE O/P EST LOW 20 MIN: CPT

## 2023-05-03 NOTE — CONSULT LETTER
[FreeTextEntry1] : Dear ,\par \par I had the pleasure of evaluating your patient, RICCO PERALES today in pulmonary consultation.  Please refer to my attached note for my findings and recommendations.\par \par \par Thank you for allowing me to participate in the care of your patient, please feel free to call with any questions or concerns.\par \par \par Sincerely,\par \par Christy Ness MD\par [facility] \par [facility]\par \par

## 2023-05-03 NOTE — ASSESSMENT
[FreeTextEntry1] : Discussed treatment options with patient for mild ketan.  He declines treatment at this time.  Ultimately weight loss should address this as well.  There is no pulmonary contraindication to proceed with bariatric surgery.  Standard KETAN precautions should be taken post operatively.  Again stressed smoking cessation. \par \par fu PRN

## 2023-05-03 NOTE — PROCEDURE
[FreeTextEntry1] : Reviewed:\par \par HST: mild ketan, ahi 6\par \par PFT: Normal spirometry.  Lung volumes normal. DLCO normal.\par \par CXR: clear lungs, no focal consolidation or pleural effusions, cardiac silhouette appears normal.  No bony abnormality.\par

## 2023-05-03 NOTE — HISTORY OF PRESENT ILLNESS
[Never] : never [Current] : current [TextBox_4] : visit to review hst\par mild ketan, ahi 6\par \par Prior history:\par \par 22M with morbid obesity here for pulmonary clearance prior to bariatric surgery.\par \par Denies medical hx\par no meds\par no hx of asthma/frequent lung infections\par no prior surgeries\par denies sob/cough/wheeze\par reports snoring, never tested for KETAN, sometimes tired during the day

## 2023-05-04 ENCOUNTER — APPOINTMENT (OUTPATIENT)
Dept: CARDIOLOGY | Facility: CLINIC | Age: 23
End: 2023-05-04
Payer: MEDICAID

## 2023-05-04 PROCEDURE — 93015 CV STRESS TEST SUPVJ I&R: CPT

## 2023-05-08 ENCOUNTER — NON-APPOINTMENT (OUTPATIENT)
Age: 23
End: 2023-05-08

## 2023-05-23 ENCOUNTER — OUTPATIENT (OUTPATIENT)
Dept: OUTPATIENT SERVICES | Facility: HOSPITAL | Age: 23
LOS: 1 days | End: 2023-05-23
Payer: MEDICAID

## 2023-05-23 ENCOUNTER — APPOINTMENT (OUTPATIENT)
Dept: INTERNAL MEDICINE | Facility: CLINIC | Age: 23
End: 2023-05-23
Payer: MEDICAID

## 2023-05-23 VITALS
DIASTOLIC BLOOD PRESSURE: 70 MMHG | BODY MASS INDEX: 40.43 KG/M2 | OXYGEN SATURATION: 97 % | WEIGHT: 315 LBS | HEIGHT: 74 IN | HEART RATE: 80 BPM | SYSTOLIC BLOOD PRESSURE: 118 MMHG

## 2023-05-23 VITALS
BODY MASS INDEX: 40.43 KG/M2 | OXYGEN SATURATION: 97 % | HEIGHT: 74 IN | SYSTOLIC BLOOD PRESSURE: 118 MMHG | HEART RATE: 80 BPM | DIASTOLIC BLOOD PRESSURE: 70 MMHG | WEIGHT: 315 LBS

## 2023-05-23 DIAGNOSIS — Z02.1 ENCOUNTER FOR PRE-EMPLOYMENT EXAMINATION: ICD-10-CM

## 2023-05-23 PROCEDURE — G0463: CPT

## 2023-05-23 PROCEDURE — 99214 OFFICE O/P EST MOD 30 MIN: CPT | Mod: GC

## 2023-05-24 NOTE — END OF VISIT
[] : Resident [FreeTextEntry3] : labs as above, will sched pre-op visit pending more info from surg. otherwise up to date.  [Time Spent: ___ minutes] : I have spent [unfilled] minutes of time on the encounter.

## 2023-05-24 NOTE — ASSESSMENT
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 Lanier\par -Will need additional appointment with Medicine for preoperative clearance once surgery date is set, discussed with patient as well as mother via telephone\par \par RTC for pre-operative evaluation once surgery date is set.\par D/w attending, Dr. Rodríguez.

## 2023-05-24 NOTE — PHYSICAL EXAM
[No Acute Distress] : no acute distress [Well Nourished] : well nourished [Well Developed] : well developed [Normal Sclera/Conjunctiva] : normal sclera/conjunctiva [PERRL] : pupils equal round and reactive to light [EOMI] : extraocular movements intact [Normal Outer Ear/Nose] : the outer ears and nose were normal in appearance [Normal Oropharynx] : the oropharynx was normal [No JVD] : no jugular venous distention [No Lymphadenopathy] : no lymphadenopathy [Supple] : supple [No Respiratory Distress] : no respiratory distress  [No Accessory Muscle Use] : no accessory muscle use [Clear to Auscultation] : lungs were clear to auscultation bilaterally [Normal Rate] : normal rate  [Regular Rhythm] : with a regular rhythm [Normal S1, S2] : normal S1 and S2 [No Edema] : there was no peripheral edema [No Palpable Aorta] : no palpable aorta [No Extremity Clubbing/Cyanosis] : no extremity clubbing/cyanosis [Non Tender] : non-tender [No HSM] : no HSM [Normal Bowel Sounds] : normal bowel sounds [Normal Posterior Cervical Nodes] : no posterior cervical lymphadenopathy [Normal Anterior Cervical Nodes] : no anterior cervical lymphadenopathy [No CVA Tenderness] : no CVA  tenderness [No Spinal Tenderness] : no spinal tenderness [No Joint Swelling] : no joint swelling [Grossly Normal Strength/Tone] : grossly normal strength/tone [No Rash] : no rash [Coordination Grossly Intact] : coordination grossly intact [No Focal Deficits] : no focal deficits [Normal Gait] : normal gait [Normal Affect] : the affect was normal [Normal Insight/Judgement] : insight and judgment were intact [de-identified] : +central adiposity

## 2023-05-24 NOTE — HISTORY OF PRESENT ILLNESS
[FreeTextEntry1] : Pre-Employment Health Visit [de-identified] : 21yo M with morbid obesity here for pre-employment health visit. Patient was not accompanied by his mother today. He is planned for bariatric surgery with Dr. Lanier soon but no date set yet. He is asking about medical 'clearance'. I discussed with patient and mother over the telephone that medical clearance cannot happen without surgery date set. 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 He otherwise reports his health and mood as good. Has TTE scheduled for 6/5/2023 for pre-operative evaluation. It was discussed with patient and mother that an additional appointment will need to be scheduled for medical clearance pre-operatively and they expressed understanding.

## 2023-05-25 DIAGNOSIS — I10 ESSENTIAL (PRIMARY) HYPERTENSION: ICD-10-CM

## 2023-05-26 LAB
ALBUMIN SERPL ELPH-MCNC: 4.6 G/DL
ALP BLD-CCNC: 85 U/L
ALT SERPL-CCNC: 20 U/L
ANION GAP SERPL CALC-SCNC: 14 MMOL/L
AST SERPL-CCNC: 18 U/L
BASOPHILS # BLD AUTO: 0.03 K/UL
BASOPHILS NFR BLD AUTO: 0.6 %
BILIRUB SERPL-MCNC: 0.5 MG/DL
BUN SERPL-MCNC: 9 MG/DL
CALCIUM SERPL-MCNC: 9.8 MG/DL
CHLORIDE SERPL-SCNC: 103 MMOL/L
CHOLEST SERPL-MCNC: 154 MG/DL
CO2 SERPL-SCNC: 25 MMOL/L
CREAT SERPL-MCNC: 1.11 MG/DL
EGFR: 96 ML/MIN/1.73M2
EOSINOPHIL # BLD AUTO: 0.1 K/UL
EOSINOPHIL NFR BLD AUTO: 2.1 %
ESTIMATED AVERAGE GLUCOSE: 108 MG/DL
GLUCOSE SERPL-MCNC: 89 MG/DL
HBA1C MFR BLD HPLC: 5.4 %
HCT VFR BLD CALC: 50.8 %
HDLC SERPL-MCNC: 45 MG/DL
HGB BLD-MCNC: 16.6 G/DL
IMM GRANULOCYTES NFR BLD AUTO: 0.2 %
LDLC SERPL CALC-MCNC: 89 MG/DL
LYMPHOCYTES # BLD AUTO: 1.96 K/UL
LYMPHOCYTES NFR BLD AUTO: 41.2 %
M TB IFN-G BLD-IMP: NEGATIVE
MAN DIFF?: NORMAL
MCHC RBC-ENTMCNC: 27.3 PG
MCHC RBC-ENTMCNC: 32.7 GM/DL
MCV RBC AUTO: 83.7 FL
MEV IGG FLD QL IA: 208 AU/ML
MEV IGG+IGM SER-IMP: POSITIVE
MONOCYTES # BLD AUTO: 0.25 K/UL
MONOCYTES NFR BLD AUTO: 5.3 %
MUV AB SER-ACNC: POSITIVE
MUV IGG SER QL IA: 169 AU/ML
NEUTROPHILS # BLD AUTO: 2.41 K/UL
NEUTROPHILS NFR BLD AUTO: 50.6 %
NONHDLC SERPL-MCNC: 110 MG/DL
PLATELET # BLD AUTO: 229 K/UL
POTASSIUM SERPL-SCNC: 4.3 MMOL/L
PROT SERPL-MCNC: 7.1 G/DL
QUANTIFERON TB PLUS MITOGEN MINUS NIL: 8.53 IU/ML
QUANTIFERON TB PLUS NIL: 0.01 IU/ML
QUANTIFERON TB PLUS TB1 MINUS NIL: 0 IU/ML
QUANTIFERON TB PLUS TB2 MINUS NIL: 0 IU/ML
RBC # BLD: 6.07 M/UL
RBC # FLD: 13.8 %
RUBV IGG FLD-ACNC: 5 INDEX
RUBV IGG SER-IMP: POSITIVE
SODIUM SERPL-SCNC: 142 MMOL/L
TRIGL SERPL-MCNC: 106 MG/DL
VZV AB TITR SER: POSITIVE
VZV IGG SER IF-ACNC: 168.3 INDEX
WBC # FLD AUTO: 4.76 K/UL

## 2023-05-30 DIAGNOSIS — Z02.1 ENCOUNTER FOR PRE-EMPLOYMENT EXAMINATION: ICD-10-CM

## 2023-05-30 DIAGNOSIS — E66.01 MORBID (SEVERE) OBESITY DUE TO EXCESS CALORIES: ICD-10-CM

## 2023-05-31 ENCOUNTER — APPOINTMENT (OUTPATIENT)
Dept: PULMONOLOGY | Facility: CLINIC | Age: 23
End: 2023-05-31

## 2023-06-01 ENCOUNTER — APPOINTMENT (OUTPATIENT)
Dept: RADIOLOGY | Facility: HOSPITAL | Age: 23
End: 2023-06-01

## 2023-06-01 ENCOUNTER — OUTPATIENT (OUTPATIENT)
Dept: OUTPATIENT SERVICES | Facility: HOSPITAL | Age: 23
LOS: 1 days | Discharge: HOME | End: 2023-06-01
Payer: MEDICAID

## 2023-06-01 PROCEDURE — 74240 X-RAY XM UPR GI TRC 1CNTRST: CPT | Mod: 26

## 2023-06-05 ENCOUNTER — APPOINTMENT (OUTPATIENT)
Dept: INTERNAL MEDICINE | Facility: CLINIC | Age: 23
End: 2023-06-05

## 2023-06-05 ENCOUNTER — APPOINTMENT (OUTPATIENT)
Dept: CARDIOLOGY | Facility: CLINIC | Age: 23
End: 2023-06-05
Payer: MEDICAID

## 2023-06-05 PROCEDURE — 93306 TTE W/DOPPLER COMPLETE: CPT

## 2023-06-12 ENCOUNTER — APPOINTMENT (OUTPATIENT)
Dept: SURGERY | Facility: CLINIC | Age: 23
End: 2023-06-12

## 2023-06-12 ENCOUNTER — APPOINTMENT (OUTPATIENT)
Dept: SURGERY | Facility: CLINIC | Age: 23
End: 2023-06-12
Payer: MEDICAID

## 2023-06-12 VITALS
HEIGHT: 74 IN | RESPIRATION RATE: 18 BRPM | SYSTOLIC BLOOD PRESSURE: 185 MMHG | BODY MASS INDEX: 40.43 KG/M2 | HEART RATE: 85 BPM | WEIGHT: 315 LBS | TEMPERATURE: 95.9 F | OXYGEN SATURATION: 99 % | DIASTOLIC BLOOD PRESSURE: 105 MMHG

## 2023-06-12 PROCEDURE — 99214 OFFICE O/P EST MOD 30 MIN: CPT

## 2023-06-13 ENCOUNTER — OUTPATIENT (OUTPATIENT)
Dept: OUTPATIENT SERVICES | Facility: HOSPITAL | Age: 23
LOS: 1 days | End: 2023-06-13
Payer: MEDICAID

## 2023-06-13 ENCOUNTER — APPOINTMENT (OUTPATIENT)
Dept: INTERNAL MEDICINE | Facility: CLINIC | Age: 23
End: 2023-06-13

## 2023-06-13 VITALS
OXYGEN SATURATION: 99 % | TEMPERATURE: 98 F | SYSTOLIC BLOOD PRESSURE: 140 MMHG | HEIGHT: 74 IN | WEIGHT: 315 LBS | RESPIRATION RATE: 15 BRPM | DIASTOLIC BLOOD PRESSURE: 85 MMHG | HEART RATE: 60 BPM

## 2023-06-13 DIAGNOSIS — E66.01 MORBID (SEVERE) OBESITY DUE TO EXCESS CALORIES: ICD-10-CM

## 2023-06-13 DIAGNOSIS — Z29.9 ENCOUNTER FOR PROPHYLACTIC MEASURES, UNSPECIFIED: ICD-10-CM

## 2023-06-13 DIAGNOSIS — Z01.818 ENCOUNTER FOR OTHER PREPROCEDURAL EXAMINATION: ICD-10-CM

## 2023-06-13 DIAGNOSIS — Z98.890 OTHER SPECIFIED POSTPROCEDURAL STATES: Chronic | ICD-10-CM

## 2023-06-13 LAB
ALBUMIN SERPL ELPH-MCNC: 4.6 G/DL — SIGNIFICANT CHANGE UP (ref 3.3–5)
ALP SERPL-CCNC: 83 U/L — SIGNIFICANT CHANGE UP (ref 40–120)
ALT FLD-CCNC: 20 U/L — SIGNIFICANT CHANGE UP (ref 10–45)
ANION GAP SERPL CALC-SCNC: 12 MMOL/L — SIGNIFICANT CHANGE UP (ref 5–17)
AST SERPL-CCNC: 19 U/L — SIGNIFICANT CHANGE UP (ref 10–40)
BILIRUB SERPL-MCNC: 0.6 MG/DL — SIGNIFICANT CHANGE UP (ref 0.2–1.2)
BLD GP AB SCN SERPL QL: NEGATIVE — SIGNIFICANT CHANGE UP
BUN SERPL-MCNC: 12 MG/DL — SIGNIFICANT CHANGE UP (ref 7–23)
CALCIUM SERPL-MCNC: 9.8 MG/DL — SIGNIFICANT CHANGE UP (ref 8.4–10.5)
CHLORIDE SERPL-SCNC: 104 MMOL/L — SIGNIFICANT CHANGE UP (ref 96–108)
CO2 SERPL-SCNC: 23 MMOL/L — SIGNIFICANT CHANGE UP (ref 22–31)
CREAT SERPL-MCNC: 1.04 MG/DL — SIGNIFICANT CHANGE UP (ref 0.5–1.3)
EGFR: 103 ML/MIN/1.73M2 — SIGNIFICANT CHANGE UP
GLUCOSE SERPL-MCNC: 87 MG/DL — SIGNIFICANT CHANGE UP (ref 70–99)
HCT VFR BLD CALC: 50.4 % — HIGH (ref 39–50)
HGB BLD-MCNC: 17.3 G/DL — HIGH (ref 13–17)
MCHC RBC-ENTMCNC: 27.8 PG — SIGNIFICANT CHANGE UP (ref 27–34)
MCHC RBC-ENTMCNC: 34.3 GM/DL — SIGNIFICANT CHANGE UP (ref 32–36)
MCV RBC AUTO: 80.9 FL — SIGNIFICANT CHANGE UP (ref 80–100)
NRBC # BLD: 0 /100 WBCS — SIGNIFICANT CHANGE UP (ref 0–0)
PLATELET # BLD AUTO: 234 K/UL — SIGNIFICANT CHANGE UP (ref 150–400)
POTASSIUM SERPL-MCNC: 4.1 MMOL/L — SIGNIFICANT CHANGE UP (ref 3.5–5.3)
POTASSIUM SERPL-SCNC: 4.1 MMOL/L — SIGNIFICANT CHANGE UP (ref 3.5–5.3)
PROT SERPL-MCNC: 7.2 G/DL — SIGNIFICANT CHANGE UP (ref 6–8.3)
RBC # BLD: 6.23 M/UL — HIGH (ref 4.2–5.8)
RBC # FLD: 13.2 % — SIGNIFICANT CHANGE UP (ref 10.3–14.5)
RH IG SCN BLD-IMP: POSITIVE — SIGNIFICANT CHANGE UP
SODIUM SERPL-SCNC: 139 MMOL/L — SIGNIFICANT CHANGE UP (ref 135–145)
WBC # BLD: 5.09 K/UL — SIGNIFICANT CHANGE UP (ref 3.8–10.5)
WBC # FLD AUTO: 5.09 K/UL — SIGNIFICANT CHANGE UP (ref 3.8–10.5)

## 2023-06-13 PROCEDURE — G0463: CPT

## 2023-06-13 PROCEDURE — 86901 BLOOD TYPING SEROLOGIC RH(D): CPT

## 2023-06-13 PROCEDURE — 83036 HEMOGLOBIN GLYCOSYLATED A1C: CPT

## 2023-06-13 PROCEDURE — 86900 BLOOD TYPING SEROLOGIC ABO: CPT

## 2023-06-13 PROCEDURE — 85027 COMPLETE CBC AUTOMATED: CPT

## 2023-06-13 PROCEDURE — 86850 RBC ANTIBODY SCREEN: CPT

## 2023-06-13 PROCEDURE — 80053 COMPREHEN METABOLIC PANEL: CPT

## 2023-06-13 NOTE — H&P PST ADULT - ASSESSMENT
BLAZEI VTE 2.0 SCORE [CLOT updated 2019]    AGE RELATED RISK FACTORS                                                       MOBILITY RELATED FACTORS  [ ] Age 41-60 years                                            (1 Point)                    [ ] Bed rest                                                        (1 Point)  [ ] Age: 61-74 years                                           (2 Points)                  [ ] Plaster cast                                                   (2 Points)  [ ] Age= 75 years                                              (3 Points)                    [ ] Bed bound for more than 72 hours                 (2 Points)    DISEASE RELATED RISK FACTORS                                               GENDER SPECIFIC FACTORS  [ ] Edema in the lower extremities                       (1 Point)              [ ] Pregnancy                                                     (1 Point)  [ ] Varicose veins                                               (1 Point)                     [ ] Post-partum < 6 weeks                                   (1 Point)             [X ] BMI > 25 Kg/m2                                            (1 Point)                     [ ] Hormonal therapy  or oral contraception          (1 Point)                 [ ] Sepsis (in the previous month)                        (1 Point)               [ ] History of pregnancy complications                 (1 point)  [ ] Pneumonia or serious lung disease                                               [ ] Unexplained or recurrent                     (1 Point)           (in the previous month)                               (1 Point)  [ ] Abnormal pulmonary function test                     (1 Point)                 SURGERY RELATED RISK FACTORS  [ ] Acute myocardial infarction                              (1 Point)               [ ]  Section                                             (1 Point)  [ ] Congestive heart failure (in the previous month)  (1 Point)      [ ] Minor surgery                                                  (1 Point)   [ ] Inflammatory bowel disease                             (1 Point)               [ ] Arthroscopic surgery                                        (2 Points)  [ ] Central venous access                                      (2 Points)                [X ] General surgery lasting more than 45 minutes (2 points)  [ ] Malignancy- Present or previous                   (2 Points)                [ ] Elective arthroplasty                                         (5 points)    [ ] Stroke (in the previous month)                          (5 Points)                                                                                                                                                           HEMATOLOGY RELATED FACTORS                                                 TRAUMA RELATED RISK FACTORS  [ ] Prior episodes of VTE                                     (3 Points)                [ ] Fracture of the hip, pelvis, or leg                       (5 Points)  [ ] Positive family history for VTE                         (3 Points)             [ ] Acute spinal cord injury (in the previous month)  (5 Points)  [ ] Prothrombin 51827 A                                     (3 Points)               [ ] Paralysis  (less than 1 month)                             (5 Points)  [ ] Factor V Leiden                                             (3 Points)                  [ ] Multiple Trauma within 1 month                        (5 Points)  [ ] Lupus anticoagulants                                     (3 Points)                                                           [ ] Anticardiolipin antibodies                               (3 Points)                                                       [ ] High homocysteine in the blood                      (3 Points)                                             [ ] Other congenital or acquired thrombophilia      (3 Points)                                                [ ] Heparin induced thrombocytopenia                  (3 Points)                                     Total Score [     3     ]

## 2023-06-13 NOTE — H&P PST ADULT - PROBLEM SELECTOR PLAN 2
Procedure: Laparoscopic vertical sleeve 6/21/23.  PST labs pending  AC: None  Medication changes: None

## 2023-06-13 NOTE — H&P PST ADULT - FUNCTIONAL STATUS
Able to walk for hours, 1 flight of stairs at home, multiple times per galilea, ADL's- Denies SOB/4-10 METS

## 2023-06-13 NOTE — H&P PST ADULT - HISTORY OF PRESENT ILLNESS
23 y/o M with PMHx of obesity (BMI 45.7).  Presents to PST prior to Laparoscopic vertical sleeve 6/21/23.  Patient denies SOB, CP, palpitation, blood in the stool or urine, N/V/D/C, HA, dizziness, seizures. Denies clotting or bleeding disorders.   23 y/o M with PMHx of obesity (BMI 45.7).  Presents to PST prior to Laparoscopic vertical sleeve 6/21/23.  Patient accompanied by his mother Keke Sánchez.   Patient denies SOB, CP, palpitation, blood in the stool or urine, N/V/D/C, HA, dizziness, seizures. Denies clotting or bleeding disorders.

## 2023-06-14 ENCOUNTER — APPOINTMENT (OUTPATIENT)
Dept: RADIOLOGY | Facility: HOSPITAL | Age: 23
End: 2023-06-14

## 2023-06-14 ENCOUNTER — APPOINTMENT (OUTPATIENT)
Dept: INTERNAL MEDICINE | Facility: CLINIC | Age: 23
End: 2023-06-14

## 2023-06-14 LAB
A1C WITH ESTIMATED AVERAGE GLUCOSE RESULT: 5.5 % — SIGNIFICANT CHANGE UP (ref 4–5.6)
ESTIMATED AVERAGE GLUCOSE: 111 MG/DL — SIGNIFICANT CHANGE UP (ref 68–114)

## 2023-06-16 ENCOUNTER — APPOINTMENT (OUTPATIENT)
Dept: INTERNAL MEDICINE | Facility: CLINIC | Age: 23
End: 2023-06-16
Payer: MEDICAID

## 2023-06-16 ENCOUNTER — NON-APPOINTMENT (OUTPATIENT)
Age: 23
End: 2023-06-16

## 2023-06-16 ENCOUNTER — OUTPATIENT (OUTPATIENT)
Dept: OUTPATIENT SERVICES | Facility: HOSPITAL | Age: 23
LOS: 1 days | End: 2023-06-16
Payer: MEDICAID

## 2023-06-16 VITALS
SYSTOLIC BLOOD PRESSURE: 126 MMHG | HEART RATE: 85 BPM | WEIGHT: 315 LBS | OXYGEN SATURATION: 99 % | DIASTOLIC BLOOD PRESSURE: 84 MMHG | BODY MASS INDEX: 39.17 KG/M2 | HEIGHT: 75 IN

## 2023-06-16 DIAGNOSIS — I10 ESSENTIAL (PRIMARY) HYPERTENSION: ICD-10-CM

## 2023-06-16 DIAGNOSIS — M79.10 MYALGIA, UNSPECIFIED SITE: ICD-10-CM

## 2023-06-16 DIAGNOSIS — Z98.890 OTHER SPECIFIED POSTPROCEDURAL STATES: Chronic | ICD-10-CM

## 2023-06-16 DIAGNOSIS — Z98.84 BARIATRIC SURGERY STATUS: ICD-10-CM

## 2023-06-16 DIAGNOSIS — E66.01 MORBID (SEVERE) OBESITY DUE TO EXCESS CALORIES: ICD-10-CM

## 2023-06-16 PROCEDURE — 99213 OFFICE O/P EST LOW 20 MIN: CPT | Mod: GE

## 2023-06-16 PROCEDURE — G0463: CPT

## 2023-06-16 NOTE — REVIEW OF SYSTEMS
[Chest Pain] : chest pain [Fever] : no fever [Vision Problems] : no vision problems [Palpitations] : no palpitations [Lower Ext Edema] : no lower extremity edema [Orthopena] : no orthopnea [Paroxysmal Nocturnal Dyspnea] : no paroxysmal nocturnal dyspnea [Shortness Of Breath] : no shortness of breath [Wheezing] : no wheezing [Cough] : no cough [Dyspnea on Exertion] : not dyspnea on exertion [Abdominal Pain] : no abdominal pain [Nausea] : no nausea [Constipation] : no constipation [Diarrhea] : no diarrhea [Vomiting] : no vomiting [Melena] : no melena [Dysuria] : no dysuria [Hematuria] : no hematuria [Skin Rash] : no skin rash [Headache] : no headache [Dizziness] : no dizziness [Fainting] : no fainting [Anxiety] : no anxiety [Depression] : no depression [FreeTextEntry5] : Chest pain as per HPI

## 2023-06-16 NOTE — ASSESSMENT
[Patient Optimized for Surgery] : Patient optimized for surgery [No Further Testing Recommended] : no further testing recommended [As per surgery] : as per surgery [FreeTextEntry4] : 22 M PMH morbid obesity presenting for medical clearance for bariatric surgery with Dr. Lanier on June 21st. Found to have muscular chest ache reproducible on exam with palpation. \par \par #Acute onset constant chest pain 6-7/10 with muscular aches with some radiation to back after working out with dumbbells concerning muscular ache post-workout\par -ECG sinus conner rhythm without ST segment changes; noted isolated inverted T-wave in AVR and V1\par -Low concern for ACS at this time\par -Continue to monitor as overall improving\par \par #Medical clearance\par RCRI: 3.9 % 30-day risk of death, MI, or cardiac arrest\par From [place] 2017, based on pooled data from 5 high quality external validations (4 prospective). These numbers are higher than those often quoted from the now-outdated original study ([place] 1999). See Evidence for details.\par Patient with acceptable exercise tolerance.\par Metabolic equivalents >4.\par Patient medically optimized from medicine standpoint for bariatric surgery.

## 2023-06-16 NOTE — HISTORY OF PRESENT ILLNESS
[No Pertinent Cardiac History] : no history of aortic stenosis, atrial fibrillation, coronary artery disease, recent myocardial infarction, or implantable device/pacemaker [No Pertinent Pulmonary History] : no history of asthma, COPD, sleep apnea, or smoking [No Adverse Anesthesia Reaction] : no adverse anesthesia reaction in self or family member [(Patient denies any chest pain, claudication, dyspnea on exertion, orthopnea, palpitations or syncope)] : Patient denies any chest pain, claudication, dyspnea on exertion, orthopnea, palpitations or syncope [____ METs%] : [unfilled] METs% [Smoker] : not a smoker [Chronic Anticoagulation] : no chronic anticoagulation [Chronic Kidney Disease] : no chronic kidney disease [Diabetes] : no diabetes [FreeTextEntry1] : bariatric surgery [FreeTextEntry2] : 6/21/2023 [FreeTextEntry3] : Dr. Lanier [FreeTextEntry4] : 22 M PMH morbid obesity presenting for medical clearance for bariatric surgery with Dr. Lanier on June 21st. Patient reports no substantial health interval health changes. Stopped marijuana 2 weeks ago. Patient reports being able to walk "many blocks" and go up flight of stairs without issues. Currently, patient says that he has been experiencing 3-days acute onset constant chest pain that started as 9/10 to now 6-7/10 with muscular aches with some radiation to back after working out with dumbbells. However, he reports absence of palpitations, dizziness, fainting, shortness of breath, or shortness of breath on exertion. He tried taking aspirin and used alexandrea's cream and was able to sleep with the latter. He also reports absence of headaches, visual disturbances, abdominal pain, nausea, vomiting, diarrhea, hematochezia, melena, dysuria, or hematuria. \par

## 2023-06-16 NOTE — PHYSICAL EXAM
[No Acute Distress] : no acute distress [No Lymphadenopathy] : no lymphadenopathy [No Respiratory Distress] : no respiratory distress  [No Accessory Muscle Use] : no accessory muscle use [Normal Rate] : normal rate  [Regular Rhythm] : with a regular rhythm [Normal S1, S2] : normal S1 and S2 [No Murmur] : no murmur heard [No Edema] : there was no peripheral edema [Soft] : abdomen soft [Non Tender] : non-tender [Non-distended] : non-distended [Normal Supraclavicular Nodes] : no supraclavicular lymphadenopathy [Normal Posterior Cervical Nodes] : no posterior cervical lymphadenopathy [Normal Anterior Cervical Nodes] : no anterior cervical lymphadenopathy [de-identified] : Morbid obesity [de-identified] : Grossly clear on aucultation but difficlut to fully appreciate given mrobidly obese habitus [de-identified] : muscular chest ache reproducible on exam with palpation

## 2023-06-20 ENCOUNTER — TRANSCRIPTION ENCOUNTER (OUTPATIENT)
Age: 23
End: 2023-06-20

## 2023-06-20 LAB — TSH SERPL-ACNC: 2.69 UIU/ML

## 2023-06-21 ENCOUNTER — RESULT REVIEW (OUTPATIENT)
Age: 23
End: 2023-06-21

## 2023-06-21 ENCOUNTER — NON-APPOINTMENT (OUTPATIENT)
Age: 23
End: 2023-06-21

## 2023-06-21 ENCOUNTER — APPOINTMENT (OUTPATIENT)
Dept: SURGERY | Facility: HOSPITAL | Age: 23
End: 2023-06-21
Payer: MEDICAID

## 2023-06-21 ENCOUNTER — INPATIENT (INPATIENT)
Facility: HOSPITAL | Age: 23
LOS: 0 days | Discharge: HOME | DRG: 621 | End: 2023-06-22
Attending: SURGERY | Admitting: SURGERY
Payer: MEDICAID

## 2023-06-21 VITALS
WEIGHT: 315 LBS | SYSTOLIC BLOOD PRESSURE: 113 MMHG | RESPIRATION RATE: 18 BRPM | HEIGHT: 74.02 IN | HEART RATE: 58 BPM | DIASTOLIC BLOOD PRESSURE: 83 MMHG | TEMPERATURE: 98 F | OXYGEN SATURATION: 98 %

## 2023-06-21 DIAGNOSIS — Z98.890 OTHER SPECIFIED POSTPROCEDURAL STATES: Chronic | ICD-10-CM

## 2023-06-21 LAB
ANION GAP SERPL CALC-SCNC: 14 MMOL/L — SIGNIFICANT CHANGE UP (ref 5–17)
BASOPHILS # BLD AUTO: 0.02 K/UL — SIGNIFICANT CHANGE UP (ref 0–0.2)
BASOPHILS NFR BLD AUTO: 0.2 % — SIGNIFICANT CHANGE UP (ref 0–2)
BUN SERPL-MCNC: 11 MG/DL — SIGNIFICANT CHANGE UP (ref 7–23)
CALCIUM SERPL-MCNC: 9.3 MG/DL — SIGNIFICANT CHANGE UP (ref 8.4–10.5)
CHLORIDE SERPL-SCNC: 101 MMOL/L — SIGNIFICANT CHANGE UP (ref 96–108)
CO2 SERPL-SCNC: 24 MMOL/L — SIGNIFICANT CHANGE UP (ref 22–31)
CREAT SERPL-MCNC: 1.04 MG/DL — SIGNIFICANT CHANGE UP (ref 0.5–1.3)
EGFR: 103 ML/MIN/1.73M2 — SIGNIFICANT CHANGE UP
EOSINOPHIL # BLD AUTO: 0.03 K/UL — SIGNIFICANT CHANGE UP (ref 0–0.5)
EOSINOPHIL NFR BLD AUTO: 0.3 % — SIGNIFICANT CHANGE UP (ref 0–6)
GLUCOSE BLDC GLUCOMTR-MCNC: 88 MG/DL — SIGNIFICANT CHANGE UP (ref 70–99)
GLUCOSE SERPL-MCNC: 119 MG/DL — HIGH (ref 70–99)
HCT VFR BLD CALC: 48.3 % — SIGNIFICANT CHANGE UP (ref 39–50)
HGB BLD-MCNC: 16.2 G/DL — SIGNIFICANT CHANGE UP (ref 13–17)
IMM GRANULOCYTES NFR BLD AUTO: 0.5 % — SIGNIFICANT CHANGE UP (ref 0–0.9)
LYMPHOCYTES # BLD AUTO: 1.17 K/UL — SIGNIFICANT CHANGE UP (ref 1–3.3)
LYMPHOCYTES # BLD AUTO: 10.8 % — LOW (ref 13–44)
MAGNESIUM SERPL-MCNC: 2 MG/DL — SIGNIFICANT CHANGE UP (ref 1.6–2.6)
MCHC RBC-ENTMCNC: 27.5 PG — SIGNIFICANT CHANGE UP (ref 27–34)
MCHC RBC-ENTMCNC: 33.5 GM/DL — SIGNIFICANT CHANGE UP (ref 32–36)
MCV RBC AUTO: 82 FL — SIGNIFICANT CHANGE UP (ref 80–100)
MONOCYTES # BLD AUTO: 0.2 K/UL — SIGNIFICANT CHANGE UP (ref 0–0.9)
MONOCYTES NFR BLD AUTO: 1.8 % — LOW (ref 2–14)
NEUTROPHILS # BLD AUTO: 9.35 K/UL — HIGH (ref 1.8–7.4)
NEUTROPHILS NFR BLD AUTO: 86.4 % — HIGH (ref 43–77)
NRBC # BLD: 0 /100 WBCS — SIGNIFICANT CHANGE UP (ref 0–0)
PHOSPHATE SERPL-MCNC: 3.2 MG/DL — SIGNIFICANT CHANGE UP (ref 2.5–4.5)
PLATELET # BLD AUTO: 208 K/UL — SIGNIFICANT CHANGE UP (ref 150–400)
POTASSIUM SERPL-MCNC: 4.2 MMOL/L — SIGNIFICANT CHANGE UP (ref 3.5–5.3)
POTASSIUM SERPL-SCNC: 4.2 MMOL/L — SIGNIFICANT CHANGE UP (ref 3.5–5.3)
RBC # BLD: 5.89 M/UL — HIGH (ref 4.2–5.8)
RBC # FLD: 13.5 % — SIGNIFICANT CHANGE UP (ref 10.3–14.5)
SODIUM SERPL-SCNC: 139 MMOL/L — SIGNIFICANT CHANGE UP (ref 135–145)
WBC # BLD: 10.82 K/UL — HIGH (ref 3.8–10.5)
WBC # FLD AUTO: 10.82 K/UL — HIGH (ref 3.8–10.5)

## 2023-06-21 PROCEDURE — 43775 LAP SLEEVE GASTRECTOMY: CPT

## 2023-06-21 PROCEDURE — 88307 TISSUE EXAM BY PATHOLOGIST: CPT | Mod: 26

## 2023-06-21 DEVICE — STAPLER COVIDIEN TRI-STAPLE 45MM PURPLE INTELLIGENT RELOAD: Type: IMPLANTABLE DEVICE | Status: FUNCTIONAL

## 2023-06-21 DEVICE — STAPLER COVIDIEN TRI-STAPLE 45MM BLACK INTELLIGENT RELOAD: Type: IMPLANTABLE DEVICE | Status: FUNCTIONAL

## 2023-06-21 DEVICE — CLIP APPLIER COVIDIEN ENDOCLIP III 5MM: Type: IMPLANTABLE DEVICE | Status: FUNCTIONAL

## 2023-06-21 DEVICE — STAPLER COVIDIEN TRI-STAPLE 60MM PURPLE INTELLIGENT RELOAD: Type: IMPLANTABLE DEVICE | Status: FUNCTIONAL

## 2023-06-21 DEVICE — VISTASEAL FIBRIN HUMAN 4ML: Type: IMPLANTABLE DEVICE | Status: FUNCTIONAL

## 2023-06-21 RX ORDER — HYDRALAZINE HCL 50 MG
10 TABLET ORAL ONCE
Refills: 0 | Status: COMPLETED | OUTPATIENT
Start: 2023-06-21 | End: 2023-06-21

## 2023-06-21 RX ORDER — FENTANYL CITRATE 50 UG/ML
25 INJECTION INTRAVENOUS
Refills: 0 | Status: DISCONTINUED | OUTPATIENT
Start: 2023-06-21 | End: 2023-06-21

## 2023-06-21 RX ORDER — ACETAMINOPHEN 500 MG
1000 TABLET ORAL ONCE
Refills: 0 | Status: COMPLETED | OUTPATIENT
Start: 2023-06-22 | End: 2023-06-22

## 2023-06-21 RX ORDER — LITHIUM CARBONATE 300 MG/1
1 TABLET, EXTENDED RELEASE ORAL
Refills: 0 | DISCHARGE

## 2023-06-21 RX ORDER — METOCLOPRAMIDE HCL 10 MG
10 TABLET ORAL ONCE
Refills: 0 | Status: COMPLETED | OUTPATIENT
Start: 2023-06-21 | End: 2023-06-21

## 2023-06-21 RX ORDER — PANTOPRAZOLE SODIUM 20 MG/1
40 TABLET, DELAYED RELEASE ORAL EVERY 24 HOURS
Refills: 0 | Status: DISCONTINUED | OUTPATIENT
Start: 2023-06-21 | End: 2023-06-22

## 2023-06-21 RX ORDER — LIDOCAINE HCL 20 MG/ML
0.2 VIAL (ML) INJECTION ONCE
Refills: 0 | Status: DISCONTINUED | OUTPATIENT
Start: 2023-06-21 | End: 2023-06-21

## 2023-06-21 RX ORDER — CLONAZEPAM 1 MG
1 TABLET ORAL
Refills: 0 | DISCHARGE

## 2023-06-21 RX ORDER — FOSAPREPITANT DIMEGLUMINE 150 MG/5ML
150 INJECTION, POWDER, LYOPHILIZED, FOR SOLUTION INTRAVENOUS ONCE
Refills: 0 | Status: DISCONTINUED | OUTPATIENT
Start: 2023-06-21 | End: 2023-06-21

## 2023-06-21 RX ORDER — HALOPERIDOL DECANOATE 100 MG/ML
1 INJECTION INTRAMUSCULAR
Refills: 0 | DISCHARGE

## 2023-06-21 RX ORDER — FOLIC ACID 0.8 MG
1 TABLET ORAL ONCE
Refills: 0 | Status: COMPLETED | OUTPATIENT
Start: 2023-06-21 | End: 2023-06-21

## 2023-06-21 RX ORDER — CEFAZOLIN SODIUM 1 G
3000 VIAL (EA) INJECTION ONCE
Refills: 0 | Status: COMPLETED | OUTPATIENT
Start: 2023-06-21 | End: 2023-06-21

## 2023-06-21 RX ORDER — AMLODIPINE BESYLATE 2.5 MG/1
10 TABLET ORAL ONCE
Refills: 0 | Status: DISCONTINUED | OUTPATIENT
Start: 2023-06-21 | End: 2023-06-21

## 2023-06-21 RX ORDER — AMLODIPINE BESYLATE 2.5 MG/1
10 TABLET ORAL ONCE
Refills: 0 | Status: COMPLETED | OUTPATIENT
Start: 2023-06-21 | End: 2023-06-21

## 2023-06-21 RX ORDER — SODIUM CHLORIDE 9 MG/ML
1000 INJECTION, SOLUTION INTRAVENOUS
Refills: 0 | Status: DISCONTINUED | OUTPATIENT
Start: 2023-06-21 | End: 2023-06-22

## 2023-06-21 RX ORDER — ACETAMINOPHEN 500 MG
1000 TABLET ORAL ONCE
Refills: 0 | Status: COMPLETED | OUTPATIENT
Start: 2023-06-21 | End: 2023-06-21

## 2023-06-21 RX ORDER — HYDROMORPHONE HYDROCHLORIDE 2 MG/ML
0.25 INJECTION INTRAMUSCULAR; INTRAVENOUS; SUBCUTANEOUS
Refills: 0 | Status: DISCONTINUED | OUTPATIENT
Start: 2023-06-21 | End: 2023-06-21

## 2023-06-21 RX ORDER — HYDRALAZINE HCL 50 MG
5 TABLET ORAL ONCE
Refills: 0 | Status: COMPLETED | OUTPATIENT
Start: 2023-06-21 | End: 2023-06-21

## 2023-06-21 RX ORDER — QUETIAPINE FUMARATE 200 MG/1
1 TABLET, FILM COATED ORAL
Refills: 0 | DISCHARGE

## 2023-06-21 RX ORDER — ONDANSETRON 8 MG/1
4 TABLET, FILM COATED ORAL EVERY 6 HOURS
Refills: 0 | Status: DISCONTINUED | OUTPATIENT
Start: 2023-06-21 | End: 2023-06-22

## 2023-06-21 RX ORDER — THIAMINE MONONITRATE (VIT B1) 100 MG
100 TABLET ORAL ONCE
Refills: 0 | Status: COMPLETED | OUTPATIENT
Start: 2023-06-21 | End: 2023-06-21

## 2023-06-21 RX ORDER — ONDANSETRON 8 MG/1
4 TABLET, FILM COATED ORAL ONCE
Refills: 0 | Status: COMPLETED | OUTPATIENT
Start: 2023-06-21 | End: 2023-06-21

## 2023-06-21 RX ORDER — HEPARIN SODIUM 5000 [USP'U]/ML
5000 INJECTION INTRAVENOUS; SUBCUTANEOUS ONCE
Refills: 0 | Status: COMPLETED | OUTPATIENT
Start: 2023-06-21 | End: 2023-06-21

## 2023-06-21 RX ORDER — ERGOCALCIFEROL 1.25 MG/1
0 CAPSULE ORAL
Refills: 0 | DISCHARGE

## 2023-06-21 RX ORDER — CHLORHEXIDINE GLUCONATE 213 G/1000ML
1 SOLUTION TOPICAL ONCE
Refills: 0 | Status: COMPLETED | OUTPATIENT
Start: 2023-06-21 | End: 2023-06-21

## 2023-06-21 RX ORDER — HYDROXYZINE HCL 10 MG
2 TABLET ORAL
Refills: 0 | DISCHARGE

## 2023-06-21 RX ORDER — KETOROLAC TROMETHAMINE 30 MG/ML
15 SYRINGE (ML) INJECTION EVERY 6 HOURS
Refills: 0 | Status: DISCONTINUED | OUTPATIENT
Start: 2023-06-21 | End: 2023-06-22

## 2023-06-21 RX ORDER — CEFAZOLIN SODIUM 1 G
3000 VIAL (EA) INJECTION EVERY 8 HOURS
Refills: 0 | Status: COMPLETED | OUTPATIENT
Start: 2023-06-21 | End: 2023-06-22

## 2023-06-21 RX ORDER — HYOSCYAMINE SULFATE 0.13 MG
0.12 TABLET ORAL EVERY 4 HOURS
Refills: 0 | Status: DISCONTINUED | OUTPATIENT
Start: 2023-06-21 | End: 2023-06-22

## 2023-06-21 RX ORDER — SODIUM CHLORIDE 9 MG/ML
3 INJECTION INTRAMUSCULAR; INTRAVENOUS; SUBCUTANEOUS EVERY 8 HOURS
Refills: 0 | Status: DISCONTINUED | OUTPATIENT
Start: 2023-06-21 | End: 2023-06-21

## 2023-06-21 RX ADMIN — AMLODIPINE BESYLATE 10 MILLIGRAM(S): 2.5 TABLET ORAL at 18:44

## 2023-06-21 RX ADMIN — HEPARIN SODIUM 5000 UNIT(S): 5000 INJECTION INTRAVENOUS; SUBCUTANEOUS at 10:52

## 2023-06-21 RX ADMIN — Medication 1 MILLIGRAM(S): at 17:31

## 2023-06-21 RX ADMIN — SODIUM CHLORIDE 250 MILLILITER(S): 9 INJECTION, SOLUTION INTRAVENOUS at 17:25

## 2023-06-21 RX ADMIN — HYDROMORPHONE HYDROCHLORIDE 0.25 MILLIGRAM(S): 2 INJECTION INTRAMUSCULAR; INTRAVENOUS; SUBCUTANEOUS at 15:24

## 2023-06-21 RX ADMIN — CHLORHEXIDINE GLUCONATE 1 APPLICATION(S): 213 SOLUTION TOPICAL at 10:52

## 2023-06-21 RX ADMIN — Medication 10 MILLIGRAM(S): at 18:26

## 2023-06-21 RX ADMIN — Medication 5 MILLIGRAM(S): at 17:25

## 2023-06-21 RX ADMIN — Medication 1000 MILLIGRAM(S): at 18:27

## 2023-06-21 RX ADMIN — Medication 400 MILLIGRAM(S): at 17:31

## 2023-06-21 RX ADMIN — ONDANSETRON 4 MILLIGRAM(S): 8 TABLET, FILM COATED ORAL at 15:09

## 2023-06-21 RX ADMIN — SODIUM CHLORIDE 150 MILLILITER(S): 9 INJECTION, SOLUTION INTRAVENOUS at 15:10

## 2023-06-21 RX ADMIN — Medication 15 MILLIGRAM(S): at 20:08

## 2023-06-21 RX ADMIN — Medication 100 MILLIGRAM(S): at 15:10

## 2023-06-21 RX ADMIN — Medication 200 MILLIGRAM(S): at 20:09

## 2023-06-21 RX ADMIN — HYDROMORPHONE HYDROCHLORIDE 0.25 MILLIGRAM(S): 2 INJECTION INTRAMUSCULAR; INTRAVENOUS; SUBCUTANEOUS at 15:09

## 2023-06-21 RX ADMIN — Medication 10 MILLIGRAM(S): at 18:14

## 2023-06-21 RX ADMIN — PANTOPRAZOLE SODIUM 40 MILLIGRAM(S): 20 TABLET, DELAYED RELEASE ORAL at 15:10

## 2023-06-21 RX ADMIN — Medication 0.12 MILLIGRAM(S): at 17:32

## 2023-06-21 RX ADMIN — Medication 400 MILLIGRAM(S): at 23:10

## 2023-06-21 RX ADMIN — ONDANSETRON 4 MILLIGRAM(S): 8 TABLET, FILM COATED ORAL at 21:17

## 2023-06-21 RX ADMIN — Medication 0.12 MILLIGRAM(S): at 21:17

## 2023-06-21 NOTE — BRIEF OPERATIVE NOTE - OPERATION/FINDINGS
Sleeve gastrectomy performed over 36 Fr ViSiGi. Methylene blue staple line leak test negative. Hemostasis achieved. Please see operative report for further details.

## 2023-06-21 NOTE — CHART NOTE - NSCHARTNOTEFT_GEN_A_CORE
Post Operative Note  Patient: RICCO PERALES 22y (2000) Male   MRN: [id number]  Location: [facility] PACU 08  Visit: 06-21-23 Inpatient  Date: 06-21-23 @ 18:44    Procedure: S/P sleeve gastrectomy.    Subjective: Patient seen and examined post operatively. Reports pain as controlled. endorses nausea without vomiting. Denies fever, chills, chest pain, SOB, cough.      Objective:  Vitals: T(F): 97.7 (06-21-23 @ 17:45), Max: 98.1 (06-21-23 @ 10:00)  HR: 78 (06-21-23 @ 18:15)  BP: 172/88 (06-21-23 @ 18:15) (113/83 - 180/103)  RR: 16 (06-21-23 @ 18:15)  SpO2: 99% (06-21-23 @ 18:15)  Vent Settings:     In:   06-21-23 @ 07:01  -  06-21-23 @ 18:44  --------------------------------------------------------  IN: 550 mL      IV Fluids: lactated ringers. 1000 milliLiter(s) (150 mL/Hr) IV Continuous <Continuous>  sodium chloride 0.9% 1000 milliLiter(s) (250 mL/Hr) IV Continuous <Continuous>      Out:   06-21-23 @ 07:01  -  06-21-23 @ 18:44  --------------------------------------------------------  OUT: 400 mL      EBL:     Voided Urine:   06-21-23 @ 07:01  -  06-21-23 @ 18:44  --------------------------------------------------------  OUT: 400 mL      Physical Examination:  General: NAD, resting comfortably in bed  HEENT: Normocephalic atraumatic  Respiratory: Nonlabored respirations, normal CW expansion.  Cardio: S1S2, regular rate and rhythm.  Abdomen: softly distended, appropriately tender, surgical incisions are c/d/i.  Vascular: extremities are warm and well perfused.     Imaging:  No post-op imaging studies    Assessment:  22yMale patient S/P sleeve gastrectomy.    Plan:  - Pain control PRN  - Diet: CLD  - IVF  - Activity: as tolerated  - DVT ppx: SCD's & Heparin SQ  - Bariatric protocol     Date/Time: 06-21-23 @ 18:44

## 2023-06-22 ENCOUNTER — TRANSCRIPTION ENCOUNTER (OUTPATIENT)
Age: 23
End: 2023-06-22

## 2023-06-22 VITALS
DIASTOLIC BLOOD PRESSURE: 74 MMHG | TEMPERATURE: 99 F | SYSTOLIC BLOOD PRESSURE: 140 MMHG | RESPIRATION RATE: 18 BRPM | OXYGEN SATURATION: 99 % | HEART RATE: 56 BPM

## 2023-06-22 LAB
ANION GAP SERPL CALC-SCNC: 18 MMOL/L — HIGH (ref 5–17)
BASOPHILS # BLD AUTO: 0.01 K/UL — SIGNIFICANT CHANGE UP (ref 0–0.2)
BASOPHILS NFR BLD AUTO: 0.1 % — SIGNIFICANT CHANGE UP (ref 0–2)
BUN SERPL-MCNC: 8 MG/DL — SIGNIFICANT CHANGE UP (ref 7–23)
CALCIUM SERPL-MCNC: 9.3 MG/DL — SIGNIFICANT CHANGE UP (ref 8.4–10.5)
CHLORIDE SERPL-SCNC: 101 MMOL/L — SIGNIFICANT CHANGE UP (ref 96–108)
CO2 SERPL-SCNC: 21 MMOL/L — LOW (ref 22–31)
CREAT SERPL-MCNC: 0.95 MG/DL — SIGNIFICANT CHANGE UP (ref 0.5–1.3)
EGFR: 115 ML/MIN/1.73M2 — SIGNIFICANT CHANGE UP
EOSINOPHIL # BLD AUTO: 0 K/UL — SIGNIFICANT CHANGE UP (ref 0–0.5)
EOSINOPHIL NFR BLD AUTO: 0 % — SIGNIFICANT CHANGE UP (ref 0–6)
GLUCOSE SERPL-MCNC: 117 MG/DL — HIGH (ref 70–99)
HCT VFR BLD CALC: 46 % — SIGNIFICANT CHANGE UP (ref 39–50)
HGB BLD-MCNC: 15.8 G/DL — SIGNIFICANT CHANGE UP (ref 13–17)
IMM GRANULOCYTES NFR BLD AUTO: 0.3 % — SIGNIFICANT CHANGE UP (ref 0–0.9)
LYMPHOCYTES # BLD AUTO: 0.7 K/UL — LOW (ref 1–3.3)
LYMPHOCYTES # BLD AUTO: 7.3 % — LOW (ref 13–44)
MAGNESIUM SERPL-MCNC: 2.1 MG/DL — SIGNIFICANT CHANGE UP (ref 1.6–2.6)
MCHC RBC-ENTMCNC: 27.6 PG — SIGNIFICANT CHANGE UP (ref 27–34)
MCHC RBC-ENTMCNC: 34.3 GM/DL — SIGNIFICANT CHANGE UP (ref 32–36)
MCV RBC AUTO: 80.4 FL — SIGNIFICANT CHANGE UP (ref 80–100)
MONOCYTES # BLD AUTO: 0.4 K/UL — SIGNIFICANT CHANGE UP (ref 0–0.9)
MONOCYTES NFR BLD AUTO: 4.2 % — SIGNIFICANT CHANGE UP (ref 2–14)
NEUTROPHILS # BLD AUTO: 8.47 K/UL — HIGH (ref 1.8–7.4)
NEUTROPHILS NFR BLD AUTO: 88.1 % — HIGH (ref 43–77)
NRBC # BLD: 0 /100 WBCS — SIGNIFICANT CHANGE UP (ref 0–0)
PHOSPHATE SERPL-MCNC: 3.8 MG/DL — SIGNIFICANT CHANGE UP (ref 2.5–4.5)
PLATELET # BLD AUTO: 240 K/UL — SIGNIFICANT CHANGE UP (ref 150–400)
POTASSIUM SERPL-MCNC: 4 MMOL/L — SIGNIFICANT CHANGE UP (ref 3.5–5.3)
POTASSIUM SERPL-SCNC: 4 MMOL/L — SIGNIFICANT CHANGE UP (ref 3.5–5.3)
RBC # BLD: 5.72 M/UL — SIGNIFICANT CHANGE UP (ref 4.2–5.8)
RBC # FLD: 13.6 % — SIGNIFICANT CHANGE UP (ref 10.3–14.5)
SODIUM SERPL-SCNC: 140 MMOL/L — SIGNIFICANT CHANGE UP (ref 135–145)
WBC # BLD: 9.61 K/UL — SIGNIFICANT CHANGE UP (ref 3.8–10.5)
WBC # FLD AUTO: 9.61 K/UL — SIGNIFICANT CHANGE UP (ref 3.8–10.5)

## 2023-06-22 PROCEDURE — 36415 COLL VENOUS BLD VENIPUNCTURE: CPT

## 2023-06-22 PROCEDURE — 82962 GLUCOSE BLOOD TEST: CPT

## 2023-06-22 PROCEDURE — 80048 BASIC METABOLIC PNL TOTAL CA: CPT

## 2023-06-22 PROCEDURE — 83735 ASSAY OF MAGNESIUM: CPT

## 2023-06-22 PROCEDURE — C1889: CPT

## 2023-06-22 PROCEDURE — 85025 COMPLETE CBC W/AUTO DIFF WBC: CPT

## 2023-06-22 PROCEDURE — 84100 ASSAY OF PHOSPHORUS: CPT

## 2023-06-22 PROCEDURE — C9399: CPT

## 2023-06-22 PROCEDURE — 88307 TISSUE EXAM BY PATHOLOGIST: CPT

## 2023-06-22 RX ORDER — SODIUM CHLORIDE 9 MG/ML
500 INJECTION, SOLUTION INTRAVENOUS ONCE
Refills: 0 | Status: COMPLETED | OUTPATIENT
Start: 2023-06-22 | End: 2023-06-22

## 2023-06-22 RX ORDER — OMEPRAZOLE 10 MG/1
1 CAPSULE, DELAYED RELEASE ORAL
Qty: 1 | Refills: 0
Start: 2023-06-22 | End: 2023-07-21

## 2023-06-22 RX ORDER — HEPARIN SODIUM 5000 [USP'U]/ML
5000 INJECTION INTRAVENOUS; SUBCUTANEOUS EVERY 8 HOURS
Refills: 0 | Status: DISCONTINUED | OUTPATIENT
Start: 2023-06-22 | End: 2023-06-22

## 2023-06-22 RX ORDER — HYOSCYAMINE SULFATE 0.13 MG
1 TABLET ORAL
Qty: 28 | Refills: 0
Start: 2023-06-22 | End: 2023-06-28

## 2023-06-22 RX ORDER — ENOXAPARIN SODIUM 100 MG/ML
40 INJECTION SUBCUTANEOUS
Qty: 14 | Refills: 0
Start: 2023-06-22 | End: 2023-07-05

## 2023-06-22 RX ORDER — ACETAMINOPHEN 500 MG
15 TABLET ORAL
Qty: 300 | Refills: 0
Start: 2023-06-22 | End: 2023-06-26

## 2023-06-22 RX ADMIN — Medication 1000 MILLIGRAM(S): at 00:12

## 2023-06-22 RX ADMIN — Medication 0.12 MILLIGRAM(S): at 06:23

## 2023-06-22 RX ADMIN — ONDANSETRON 4 MILLIGRAM(S): 8 TABLET, FILM COATED ORAL at 03:22

## 2023-06-22 RX ADMIN — Medication 15 MILLIGRAM(S): at 14:48

## 2023-06-22 RX ADMIN — Medication 400 MILLIGRAM(S): at 06:23

## 2023-06-22 RX ADMIN — Medication 1000 MILLIGRAM(S): at 06:46

## 2023-06-22 RX ADMIN — Medication 15 MILLIGRAM(S): at 03:22

## 2023-06-22 RX ADMIN — Medication 15 MILLIGRAM(S): at 04:22

## 2023-06-22 RX ADMIN — Medication 0.12 MILLIGRAM(S): at 14:47

## 2023-06-22 RX ADMIN — Medication 15 MILLIGRAM(S): at 10:21

## 2023-06-22 RX ADMIN — SODIUM CHLORIDE 500 MILLILITER(S): 9 INJECTION, SOLUTION INTRAVENOUS at 10:22

## 2023-06-22 RX ADMIN — ONDANSETRON 4 MILLIGRAM(S): 8 TABLET, FILM COATED ORAL at 10:21

## 2023-06-22 RX ADMIN — Medication 0.12 MILLIGRAM(S): at 03:22

## 2023-06-22 RX ADMIN — HEPARIN SODIUM 5000 UNIT(S): 5000 INJECTION INTRAVENOUS; SUBCUTANEOUS at 06:23

## 2023-06-22 RX ADMIN — Medication 400 MILLIGRAM(S): at 13:06

## 2023-06-22 RX ADMIN — Medication 200 MILLIGRAM(S): at 03:33

## 2023-06-22 RX ADMIN — Medication 0.12 MILLIGRAM(S): at 11:11

## 2023-06-22 NOTE — PROGRESS NOTE ADULT - SUBJECTIVE AND OBJECTIVE BOX
Post Op Day#: 1    Subjective: "Feels good, stomach growling no N/V, mild pain    Objective: No acute events overnight. Continuous pulse oximetry at bedside functioning, v/s stable, afebrile. Labs within acceptable range. Ambulated independently around the unit. Tolerating bariatric clear liquid diet and Voiding                                               Vital Signs Last 24 Hrs  T(C): 37.6 (22 Jun 2023 04:41), Max: 37.6 (22 Jun 2023 00:20)  T(F): 99.6 (22 Jun 2023 04:41), Max: 99.6 (22 Jun 2023 00:20)  HR: 71 (22 Jun 2023 04:41) (58 - 90)  BP: 159/85 (22 Jun 2023 04:41) (113/83 - 180/103)  BP(mean): 87 (21 Jun 2023 20:00) (87 - 132)  RR: 18 (22 Jun 2023 04:41) (12 - 22)  SpO2: 96% (22 Jun 2023 04:41) (96% - 100%)    Parameters below as of 22 Jun 2023 04:41  Patient On (Oxygen Delivery Method): room air                                                   I&O's Summary    21 Jun 2023 07:01  -  22 Jun 2023 07:00  --------------------------------------------------------  IN: 3320 mL / OUT: 4050 mL / NET: -730 mL                                                                          15.8   9.61  )-----------( 240      ( 22 Jun 2023 06:17 )             46.0                                                 06-22    140  |  101  |  8   ----------------------------<  117<H>  4.0   |  21<L>  |  0.95    Ca    9.3      22 Jun 2023 06:15  Phos  3.8     06-22  Mg     2.1     06-22      acetaminophen   IVPB .. 1000 milliGRAM(s) IV Intermittent once  heparin   Injectable 5000 Unit(s) SubCutaneous every 8 hours  hyoscyamine SL 0.125 milliGRAM(s) SubLingual every 4 hours  ketorolac   Injectable 15 milliGRAM(s) IV Push every 6 hours  lactated ringers. 1000 milliLiter(s) IV Continuous <Continuous>  ondansetron Injectable 4 milliGRAM(s) IV Push every 6 hours  pantoprazole  Injectable 40 milliGRAM(s) IV Push every 24 hours  sodium chloride 0.9% 1000 milliLiter(s) IV Continuous <Continuous>      Physical Exam:         Lungs:  clear breath sounds b/l       Heart:  Regular rate & rhythm       Abdomen:  Soft, non-distended.  Scopes sites clean, dry and intact. + bs, - flatus, no rebound or guarding       Skin:  intact, pannus w/o rash       Extremities: + pulses, no edema, no calf tenderness, negative octavio's     VTE Extended Risk Assessment Scores             [facility]  VTE Predicted RISK low:   (<1%),    Assessment and Plan: 22 year old obese male BMI 46.4. On 6/21/2023 he underwent Lap Sleeve Gastrectomy, POD # 1 stable.    - Bariatric Clear diet today then protocol derived staged meal progression supervised by RD in outpatient setting  - DVT - LMWH (patient education with teach back). GI prophylaxis, Incentive spirometry  - Ambulate as tolerated  - Procedure specific education explained including postop complications, medications and side effects, diet, vitamins and VTE prevention, written materials given  - Medication reviewed and reconciled, Bariatric meds to be obtained from [facility] prior to d/c  - D/C home once Bariatric 8-Point d/c criteria met  - Follow up with Dr Lanier  in 7-10 days, Dietitian and PMD in 30 days.      Harper Lassiter, DIDIER, ANP  [phone]

## 2023-06-22 NOTE — DISCHARGE NOTE PROVIDER - CARE PROVIDER_API CALL
Alec Lanier  Surgery  [address] [place] [address], [address]  [place] [address]  Phone: [phone]  Fax: [phone]  Follow Up Time:

## 2023-06-22 NOTE — DISCHARGE NOTE PROVIDER - NSDCMRMEDTOKEN_GEN_ALL_CORE_FT
acetaminophen 500 mg/15 mL oral liquid: 15 milliliter(s) orally every 6 hours as needed for  pain  hyoscyamine 0.125 mg sublingual tablet: 1 tab(s) sublingual every 6 hours MDD: 4  Lovenox 40 mg/0.4 mL injectable solution: 40 milligram(s) subcutaneously once a day  omeprazole 40 mg oral delayed release capsule: 1 cap(s) orally once a day open and mix in applesauce MDD: 1  Vitamin D:

## 2023-06-22 NOTE — DISCHARGE NOTE PROVIDER - NSDCCONDITION_GEN_ALL_CORE
Patient Education       Follow up with Halima Simon as soon as possible. Return to the ED if you develop fever, red/hot shoulder     Shoulder Bursitis: Care Instructions  Your Care Instructions    Bursitis is inflammation of the bursa. A bursa is a small sac of fluid that cushions a joint and helps it move easily. A bursa sits under the highest point of your shoulder. You can get bursitis by overusing your shoulder, which can happen with activities such as lifting, pitching a ball, or painting. Symptoms of bursitis include pain when you move your arm. Your arm may hurt when you try to lift it, and the pain can reach down the side of your arm. You may have trouble sleeping because of the pain. Bursitis usually gets better if you avoid the activity that caused it. If pain lasts or gets worse despite home treatment, your doctor may draw fluid from the bursa through a needle. This may relieve your pain and help your doctor know if you have an infection. If so, your doctor will prescribe antibiotics. If you have inflammation only, you may get a corticosteroid shot to reduce swelling and pain. Sometimes surgery is needed to drain or remove the bursa. Follow-up care is a key part of your treatment and safety. Be sure to make and go to all appointments, and call your doctor if you are having problems. It's also a good idea to know your test results and keep a list of the medicines you take. How can you care for yourself at home? · Put ice or a cold pack on your shoulder for 10 to 20 minutes at a time. Put a thin cloth between the ice and your skin. · After 3 days of using ice, use heat on your shoulder. You can use a hot water bottle, a heating pad set on low, or a warm, moist towel. Some doctors suggest alternating between hot and cold. · Rest your shoulder. Stop any activities that cause pain. Switch to activities that do not stress your shoulder. · Take your medicines exactly as prescribed.  Call your doctor if you think you are having a problem with your medicine. · If your doctor recommends it, take anti-inflammatory medicines to reduce pain. These include ibuprofen (Advil, Motrin) and naproxen (Aleve). Read and follow all instructions on the label. · To prevent stiffness, gently move the shoulder joint through its full range of motion. As the pain gets better, keep doing range-of-motion exercises. Ask your doctor for exercises that will make the muscles around the shoulder joint stronger. Do these as directed. · You can slowly return to the activity that caused the pain, but do it with less effort until you can do it without pain or swelling. Be sure to warm up before and stretch after you do the activity. When should you call for help? Call your doctor now or seek immediate medical care if:    · You have a fever.     · You have increased swelling or redness in your shoulder.     · You cannot use your shoulder, or the pain in your shoulder gets worse.    Watch closely for changes in your health, and be sure to contact your doctor if:    · You have pain for 2 weeks or longer despite home treatment. Where can you learn more? Go to http://donald-pedro.info/. Enter M955 in the search box to learn more about \"Shoulder Bursitis: Care Instructions. \"  Current as of: September 20, 2018  Content Version: 11.9  © 7292-7593 [employer]. Care instructions adapted under license by [employer] (which disclaims liability or warranty for this information). If you have questions about a medical condition or this instruction, always ask your healthcare professional. [place] [address] any warranty or liability for your use of this information. Stable

## 2023-06-22 NOTE — DIETITIAN INITIAL EVALUATION ADULT - ORAL INTAKE PTA/DIET HISTORY
Pt reports following a full liquid diet for 2 weeks PTA as instructed by outpatient RD. Pt reports having Ensure Max protein shakes and water. NKFA. Pt denies chewing/swallowing difficulty, nausea, vomiting, diarrhea, constipation.

## 2023-06-22 NOTE — DIETITIAN INITIAL EVALUATION ADULT - NS FNS DIET ORDER
Diet, Clear Liquid:   Bariatric Clear Liquid (BARICLLIQ)     Special Instructions for Nursing:  Bariatric Clear Liquid,  start 6 hours postop if no nausea vomiting (06-21-23 @ 14:23)

## 2023-06-22 NOTE — PROGRESS NOTE ADULT - SUBJECTIVE AND OBJECTIVE BOX
Surgery Daily Progress Note  =====================================================  SUBJECTIVE: A 22y Male Patient seen and examined at bedside on AM rounds.     ALLERGIES:  No Known Allergies      --------------------------------------------------------------------------------------    MEDICATIONS:    Neurologic Medications  acetaminophen   IVPB .. 1000 milliGRAM(s) IV Intermittent once  acetaminophen   IVPB .. 1000 milliGRAM(s) IV Intermittent once  ketorolac   Injectable 15 milliGRAM(s) IV Push every 6 hours  ondansetron Injectable 4 milliGRAM(s) IV Push every 6 hours    Respiratory Medications    Cardiovascular Medications    Gastrointestinal Medications  hyoscyamine SL 0.125 milliGRAM(s) SubLingual every 4 hours  lactated ringers. 1000 milliLiter(s) IV Continuous <Continuous>  pantoprazole  Injectable 40 milliGRAM(s) IV Push every 24 hours  sodium chloride 0.9% 1000 milliLiter(s) IV Continuous <Continuous>    Genitourinary Medications    Hematologic/Oncologic Medications    Antimicrobial/Immunologic Medications  ceFAZolin   IVPB 3000 milliGRAM(s) IV Intermittent every 8 hours    Endocrine/Metabolic Medications    Topical/Other Medications    --------------------------------------------------------------------------------------    VITAL SIGNS:  No Known Allergies      T(C): 37.6 (06-22-23 @ 00:20), Max: 37.6 (06-22-23 @ 00:20)  HR: 90 (06-22-23 @ 00:20) (58 - 90)  BP: 133/98 (06-22-23 @ 00:20) (113/83 - 180/103)  RR: 18 (06-22-23 @ 00:20) (12 - 22)  SpO2: 96% (06-22-23 @ 00:20) (96% - 100%)  --------------------------------------------------------------------------------------    EXAM    General: NAD, resting in bed comfortably.  Cardiac: regular rate, warm and well perfused  Respiratory: Nonlabored respirations, normal cw expansion.  Abdomen:   Extremities: normal strength, FROM, no deformities    --------------------------------------------------------------------------------------    I&Os  T(C): 37.6 (06-22-23 @ 00:20), Max: 37.6 (06-22-23 @ 00:20)  HR: 90 (06-22-23 @ 00:20) (58 - 90)  BP: 133/98 (06-22-23 @ 00:20) (113/83 - 180/103)  RR: 18 (06-22-23 @ 00:20) (12 - 22)  SpO2: 96% (06-22-23 @ 00:20) (96% - 100%)  --------------------------------------------------------------------------------------    LABS                          16.2   10.82 )-----------( 208      ( 21 Jun 2023 15:24 )             48.3     06-21    139  |  101  |  11  ----------------------------<  119<H>  4.2   |  24  |  1.04    Ca    9.3      21 Jun 2023 15:24  Phos  3.2     06-21  Mg     2.0     06-21        Urinalysis Basic - ( 21 Jun 2023 15:24 )    Color: x / Appearance: x / SG: x / pH: x  Gluc: 119 mg/dL / Ketone: x  / Bili: x / Urobili: x   Blood: x / Protein: x / Nitrite: x   Leuk Esterase: x / RBC: x / WBC x   Sq Epi: x / Non Sq Epi: x / Bacteria: x        06-21-23 @ 07:01  -  06-22-23 @ 00:58  --------------------------------------------------------  IN: 1500 mL / OUT: 1700 mL / NET: -200 mL      acetaminophen   IVPB .. 1000 milliGRAM(s) IV Intermittent once  acetaminophen   IVPB .. 1000 milliGRAM(s) IV Intermittent once  ceFAZolin   IVPB 3000 milliGRAM(s) IV Intermittent every 8 hours  hyoscyamine SL 0.125 milliGRAM(s) SubLingual every 4 hours  ketorolac   Injectable 15 milliGRAM(s) IV Push every 6 hours  lactated ringers. 1000 milliLiter(s) IV Continuous <Continuous>  ondansetron Injectable 4 milliGRAM(s) IV Push every 6 hours  pantoprazole  Injectable 40 milliGRAM(s) IV Push every 24 hours  sodium chloride 0.9% 1000 milliLiter(s) IV Continuous <Continuous>      RADIOLOGY, EKG & ADDITIONAL TESTS: Reviewed.

## 2023-06-22 NOTE — DISCHARGE NOTE PROVIDER - NSDCCPCAREPLAN_GEN_ALL_CORE_FT
PRINCIPAL DISCHARGE DIAGNOSIS  Diagnosis: Morbid obesity with BMI of 45.0-49.9, adult  Assessment and Plan of Treatment:

## 2023-06-22 NOTE — DIETITIAN INITIAL EVALUATION ADULT - PERTINENT MEDS FT
MEDICATIONS  (STANDING):  acetaminophen   IVPB .. 1000 milliGRAM(s) IV Intermittent once  heparin   Injectable 5000 Unit(s) SubCutaneous every 8 hours  hyoscyamine SL 0.125 milliGRAM(s) SubLingual every 4 hours  ketorolac   Injectable 15 milliGRAM(s) IV Push every 6 hours  lactated ringers. 1000 milliLiter(s) (150 mL/Hr) IV Continuous <Continuous>  ondansetron Injectable 4 milliGRAM(s) IV Push every 6 hours  pantoprazole  Injectable 40 milliGRAM(s) IV Push every 24 hours  sodium chloride 0.9% 1000 milliLiter(s) (250 mL/Hr) IV Continuous <Continuous>    MEDICATIONS  (PRN):

## 2023-06-22 NOTE — DIETITIAN INITIAL EVALUATION ADULT - OTHER INFO
Pt with multiple previous wt loss attempts per chart and was unable to lose and maintain significant wt loss. Pre-surgical wt (H&P) noted as 356 pounds (6/13). Current wt of 360 pounds (6/21/23).

## 2023-06-22 NOTE — PROGRESS NOTE ADULT - ASSESSMENT
S/P sleeve gastrectomy POD-1.    Plan:  - Diet: CLD  - Activity OOB/ambulating  - DVT PPx  - Pain management PRN  - AM labs    [facility]

## 2023-06-22 NOTE — DISCHARGE NOTE NURSING/CASE MANAGEMENT/SOCIAL WORK - PATIENT PORTAL LINK FT
You can access the FollowMyHealth Patient Portal offered by [facility] by registering at the following website: [email or web address]. By joining [facility]’s FollowMyHealth portal, you will also be able to view your health information using other applications (apps) compatible with our system.

## 2023-06-22 NOTE — DIETITIAN INITIAL EVALUATION ADULT - EDUCATION DIETARY MODIFICATIONS
Vulvar irritation when wiping.  No real dysuria.  Tan/yellow discharge.  No itching or internal irritation.  No bleeding.  Some discomfort on and off in abdomen.  Not severe.  UA and wet prep collected.  Will contact with results.     teach back/(2) meets goals/outcomes/verbalization

## 2023-06-22 NOTE — DISCHARGE NOTE PROVIDER - NSDCFUADDINST_GEN_ALL_CORE_FT
As explained and outlined on the gastric sleeve instructions. No carbonated beverage, no chewing gums no straws.

## 2023-06-22 NOTE — DIETITIAN INITIAL EVALUATION ADULT - REASON FOR ADMISSION
Body mass index [BMI] 40.0-44.9, adult    Chart reviewed, events noted. This is a "22 year old obese male BMI 46.4. On 6/21/2023 he underwent Lap Sleeve Gastrectomy, POD # 1 stable"

## 2023-06-22 NOTE — DISCHARGE NOTE PROVIDER - HOSPITAL COURSE
This is a 22 year old obese male, BMI 46.4. On 6/21/2023 he underwent Laparoscopic Sleeve Gastrectomy. Bariatric ERAS protocol followed to include preoperative and perioperative use of DVT and SSI prophylaxis as well as multi-modal non-opioid analgesics. Patient tolerated the procedure well  extubated in the operating room then transferred to the PACU in stable condition. Once hemodynamically stable and effective pain control the patient was able to ambulate with assistance. Pt was also able to void within 4 hours following the procedure. The patient was later transferred to a bariatric unit and placed on bedside continuous pulse oximetry. Pain management included IV multi-modal non-opioid analgesia then transitioned to liquid as needed.  Bariatric clear liquid diet started the day of surgery.    On POD #1 patient remained stable with no acute events overnight, has effective  pain control. Denies nausea and vomiting. Patient is ambulating independently and voiding as expected. The rest of the hospital course was uneventful and there were no post-operative complications identified.  Patient met 8-Point Bariatric Surgery D/C criteria and subsequently cleared for discharge home on POD#1. LMWH x 14 days ([facility] VTE Risk Stratification Risk low (<1% ). The patient will follow a protocol-derived staged meal progression supervised by the dietitian in the outpatient setting. Patient will follow-up with Dr. Lanier in 7-10 days, medical doctor and dietitian in 30 days. All appropriate [facility] obtained from [facility] prior to discharge. Written discharge instructions explained and given to include postoperative complications, medications and side effect, diet and  VTE prevention.

## 2023-06-22 NOTE — DIETITIAN INITIAL EVALUATION ADULT - ENTER FROM (ML/KG)
Telephone Encounter by Patsy Lr at 06/07/18 12:16 PM     Author:  Patsy Lr Service:  (none) Author Type:       Filed:  06/07/18 12:19 PM Encounter Date:  6/7/2018 Status:  Signed     :  Patsy Lr ()              BRENDA MCARTUHR    Patient Age: 66 year old    ACCT STATUS:   MESSAGE:   How long have you been experiencing symptoms?[AK1.1T]  @ 2 weeks[AK1.1M]    Do you have an indwelling catheter or do you self cath?[AK1.1T] No[AK1.1M]       Are you experiencing any of the following symptoms?    Pain or burning with urination?[AK1.1T] YES -[AK1.1M]     Urinary frequency?[AK1.1T] no[AK1.1M]    Urinary urgency?[AK1.1T] no[AK1.1M]    Visible blood in urine?[AK1.1T] YES -[AK1.1M]     Do you feel you are emptying your bladder completely?[AK1.1T] no[AK1.1M]    Do you have a fever?[AK1.1T] No[AK1.1M]     Are you taking anything for these symptoms?[AK1.1T] No[AK1.1M]     Have you had any recent surgery/procedures?[AK1.1T] No[AK1.1M]     Have you been treated recently for a UTI?[AK1.1T] No[AK1.1M]    ________________________________________________________________[AK1.1T]    Symptom have been intermittent. Thought she was noticing blood in urine but wasn't sure until today when there was a blood clot on the tissue this morning after using the washroom.  Please advise.[AK1.1M]      Next and Last Visit with Provider and Department  Next visit with JENNIFER LEÓN is on No match found  Next visit with UROLOGY is on No match found  Last visit with JENNIFER LEÓN was on 05/24/2013 at 11:00 AM in UROLOGY HLD  Last visit with UROLOGY was on 05/24/2013 at 11:00 AM in UROLOGY D     WEIGHT AND HEIGHT: As of 03/15/2018 weight is 270 lbs.(122.471 kg). Height is 5' 4\"(1.626 m).   BMI is 46.32 kg/(m^2) calculated from:     Height 5' 4\" (1.626 m) as of 3/15/18     Weight 270 lb (122.471 kg) as of 3/15/18      Allergies      Allergen   Reactions   • Lipitor [Atorvastatin Calcium]   Other - See Comments     Myalgia      • Simvastatin  Other - See Comments     Myalgia      Current outpatient prescriptions       Medication  Sig Dispense Refill   • fenofibrate (TRIGLIDE) 160 MG tablet Take 1 Tab by mouth daily. with food 90 Tab 1   • losartan (COZAAR) 100 MG tablet Take 1 Tab by mouth daily. 90 Tab 1   • sertraline (ZOLOFT) 50 MG tablet Take 1 Tab by mouth daily. 90 Tab 1   • metoprolol (TOPROL-XL) 25 MG 24 hr tablet Take 1 Tab by mouth daily. 90 Tab 1   • glipiZIDE (GLUCOTROL) 5 MG tablet Take 1 Tab by mouth every morning before breakfast. 90 Each 1   • levothyroxine 100 MCG tablet Take 2 Tabs by mouth daily. 180 Tab 1   • gabapentin (NEURONTIN) 300 MG Cap Take 1 Cap by mouth 3 (three) times daily. 270 Cap 1   • enalapril (VASOTEC) 20 MG tablet Take 1 Tab by mouth 2 (two) times daily. 180 Tab 1   • furosemide (LASIX) 40 MG tablet Take 1 Tab by mouth daily. 90 Tab 1   • potassium chloride SA (KLOR-CON) 20 MEQ tablet Take 1 Tab by mouth daily. 90 Tab 1   • oxybutynin (DITROPAN) 5 MG tablet Take 1 Tab by mouth 2 (two) times daily. 180 Tab 1   • pioglitazone (ACTOS) 30 MG tablet TAKE ONE TABLET BY MOUTH DAILY 90 Tab 1   • Nutritional Supplements (MELATONIN OR) Take  by mouth.     • IRON OR None Entered     • Multiple Vitamin (MULTIVITAMIN OR) None Entered     • Cyanocobalamin (VITAMIN B-12 OR) 1 Tablet daily        PHARMACY to use:[AK1.1T] [facility][AK1.1M]          Pharmacy preference(s) on file:    [facility] [address]  [facility] [place] [place] [address]  [facility] [place] [address] ([facility])  [facility] [address]    CALL BACK INFO:[AK1.1T] Ok to leave response (including medical information) with family member or on answering machine[AK1.1M]  ROUTING:[AK1.1T] Patient's physician/staff[AK1.1M]        PCP: Francisco Casper MD         INS: Payor: MEDICARE -  ASSIGNED / Plan: *No Plan* / Product Type: *No Product type* / Note: This is the primary coverage, but no account was found for this location or the patient's primary location.   ADDRESS:  [address] [place] [id number][AK1.1T]       Revision History        User Key Date/Time User Provider Type Action    > AK1.1 06/07/18 12:19 PM Patsy Lr  Sign    M - Manual, T - Template             18

## 2023-06-22 NOTE — DIETITIAN INITIAL EVALUATION ADULT - PERTINENT LABORATORY DATA
06-22    140  |  101  |  8   ----------------------------<  117<H>  4.0   |  21<L>  |  0.95    Ca    9.3      22 Jun 2023 06:15  Phos  3.8     06-22  Mg     2.1     06-22    A1C with Estimated Average Glucose Result: 5.5 % (06-13-23 @ 19:13)

## 2023-06-22 NOTE — DISCHARGE NOTE PROVIDER - NSDCCPTREATMENT_GEN_ALL_CORE_FT
PRINCIPAL PROCEDURE  Procedure: Laparoscopic vertical (sleeve) gastrectomy  Findings and Treatment: bariatric liquid diet, dietary supplement, increase ambulation, vte prophylaxis, follow up care

## 2023-06-23 ENCOUNTER — NON-APPOINTMENT (OUTPATIENT)
Age: 23
End: 2023-06-23

## 2023-07-01 LAB — SURGICAL PATHOLOGY STUDY: SIGNIFICANT CHANGE UP

## 2023-07-03 ENCOUNTER — APPOINTMENT (OUTPATIENT)
Dept: SURGERY | Facility: CLINIC | Age: 23
End: 2023-07-03
Payer: MEDICAID

## 2023-07-03 VITALS
TEMPERATURE: 97 F | OXYGEN SATURATION: 99 % | RESPIRATION RATE: 18 BRPM | DIASTOLIC BLOOD PRESSURE: 94 MMHG | HEART RATE: 81 BPM | WEIGHT: 315 LBS | BODY MASS INDEX: 40.43 KG/M2 | SYSTOLIC BLOOD PRESSURE: 136 MMHG | HEIGHT: 74 IN

## 2023-07-03 DIAGNOSIS — E66.01 MORBID (SEVERE) OBESITY DUE TO EXCESS CALORIES: ICD-10-CM

## 2023-07-03 PROCEDURE — 99214 OFFICE O/P EST MOD 30 MIN: CPT | Mod: 24

## 2023-07-07 ENCOUNTER — APPOINTMENT (OUTPATIENT)
Dept: RADIOLOGY | Facility: HOSPITAL | Age: 23
End: 2023-07-07

## 2023-07-10 NOTE — ED ADULT TRIAGE NOTE - WEIGHT IN LBS
[address] -Phone: [phone] Fax: [phone]    Visit  Discharge Instructions / Physician Orders     DATE: 7/11/2023     Home Care: [facility]-Not needed for wound care at this point     [address]: N/A     Wound Location: Left Lower Leg     Cleanse with: Keep Dry and Intact     Dressing Orders: Silvercel, Kerramax, 4-Layer Wrap     Frequency: Keep Dry and Intact     Additional Orders: Increase protein to diet (meat, cheese, eggs, fish, peanut butter, nuts and beans)  ELEVATE LEGS AS MUCH AS POSSIBLE  Culture Taken 6/6/23 6/20/23-Antibiotic sent to pharmacy     Your next appointment with [facility] is in 1 week with Dr. Vaughn Day     (Please note your next appointment above and if you are unable to keep, kindly give a 24 hour notice. Thank you.)  If more than 15 min late we cannot guarantee you will be seen due to clinician schedule  Per Policy, Excessive cancellation will call for dismissal from program.     If you experience any of the following, please call the [address] [place] [address] during business hours:  [phone]  Your Phone call may be forwarded to David Leon during business hours that [facility] is closed. * Increase in Pain  * Temperature over 101  * Increase in drainage from your wound  * Drainage with a foul odor  * Bleeding  * Increase in swelling  * Need for compression bandage changes due to slippage, breakthrough drainage. If you need medical attention outside of the business hours of the [address] [place] [address] please contact your PCP or go to the nearest emergency room. The information contained in the After Visit Summary has been reviewed with me, the patient and/or responsible adult, by my health care provider(s). I had the opportunity to ask questions regarding this information.  I have elected to receive;      []After Visit Summary  [x]Comprehensive Discharge Instruction        Patient 380

## 2023-07-19 ENCOUNTER — NON-APPOINTMENT (OUTPATIENT)
Age: 23
End: 2023-07-19

## 2023-08-14 ENCOUNTER — APPOINTMENT (OUTPATIENT)
Dept: SURGERY | Facility: CLINIC | Age: 23
End: 2023-08-14
Payer: MEDICAID

## 2023-08-14 VITALS
HEART RATE: 60 BPM | BODY MASS INDEX: 40.43 KG/M2 | WEIGHT: 315 LBS | RESPIRATION RATE: 17 BRPM | TEMPERATURE: 96.7 F | DIASTOLIC BLOOD PRESSURE: 84 MMHG | OXYGEN SATURATION: 99 % | SYSTOLIC BLOOD PRESSURE: 127 MMHG | HEIGHT: 74 IN

## 2023-08-14 DIAGNOSIS — E66.01 MORBID (SEVERE) OBESITY DUE TO EXCESS CALORIES: ICD-10-CM

## 2023-08-14 PROCEDURE — 99024 POSTOP FOLLOW-UP VISIT: CPT

## 2023-10-20 ENCOUNTER — APPOINTMENT (OUTPATIENT)
Dept: SURGERY | Facility: CLINIC | Age: 23
End: 2023-10-20

## 2024-04-12 ENCOUNTER — APPOINTMENT (OUTPATIENT)
Dept: INTERNAL MEDICINE | Facility: CLINIC | Age: 24
End: 2024-04-12

## 2024-05-09 ENCOUNTER — NON-APPOINTMENT (OUTPATIENT)
Age: 24
End: 2024-05-09

## 2024-05-09 ENCOUNTER — OUTPATIENT (OUTPATIENT)
Dept: OUTPATIENT SERVICES | Facility: HOSPITAL | Age: 24
LOS: 1 days | End: 2024-05-09
Payer: MEDICAID

## 2024-05-09 ENCOUNTER — APPOINTMENT (OUTPATIENT)
Dept: INTERNAL MEDICINE | Facility: CLINIC | Age: 24
End: 2024-05-09
Payer: MEDICAID

## 2024-05-09 VITALS
DIASTOLIC BLOOD PRESSURE: 82 MMHG | HEART RATE: 61 BPM | HEIGHT: 74 IN | OXYGEN SATURATION: 98 % | WEIGHT: 285 LBS | BODY MASS INDEX: 36.57 KG/M2 | SYSTOLIC BLOOD PRESSURE: 118 MMHG

## 2024-05-09 DIAGNOSIS — I10 ESSENTIAL (PRIMARY) HYPERTENSION: ICD-10-CM

## 2024-05-09 DIAGNOSIS — M25.512 PAIN IN LEFT SHOULDER: ICD-10-CM

## 2024-05-09 DIAGNOSIS — Z98.84 BARIATRIC SURGERY STATUS: ICD-10-CM

## 2024-05-09 DIAGNOSIS — Z98.890 OTHER SPECIFIED POSTPROCEDURAL STATES: Chronic | ICD-10-CM

## 2024-05-09 DIAGNOSIS — Z59.41 FOOD INSECURITY: ICD-10-CM

## 2024-05-09 DIAGNOSIS — Z00.00 ENCOUNTER FOR GENERAL ADULT MEDICAL EXAMINATION W/OUT ABNORMAL FINDINGS: ICD-10-CM

## 2024-05-09 PROCEDURE — 82728 ASSAY OF FERRITIN: CPT

## 2024-05-09 PROCEDURE — 82306 VITAMIN D 25 HYDROXY: CPT

## 2024-05-09 PROCEDURE — 84590 ASSAY OF VITAMIN A: CPT

## 2024-05-09 PROCEDURE — 82607 VITAMIN B-12: CPT

## 2024-05-09 PROCEDURE — 80061 LIPID PANEL: CPT

## 2024-05-09 PROCEDURE — 85025 COMPLETE CBC W/AUTO DIFF WBC: CPT

## 2024-05-09 PROCEDURE — 82746 ASSAY OF FOLIC ACID SERUM: CPT

## 2024-05-09 PROCEDURE — 99214 OFFICE O/P EST MOD 30 MIN: CPT | Mod: GC

## 2024-05-09 PROCEDURE — 80053 COMPREHEN METABOLIC PANEL: CPT

## 2024-05-09 PROCEDURE — 83036 HEMOGLOBIN GLYCOSYLATED A1C: CPT

## 2024-05-09 PROCEDURE — 83550 IRON BINDING TEST: CPT

## 2024-05-09 PROCEDURE — G0463: CPT

## 2024-05-09 SDOH — ECONOMIC STABILITY - FOOD INSECURITY: FOOD INSECURITY: Z59.41

## 2024-05-10 ENCOUNTER — NON-APPOINTMENT (OUTPATIENT)
Age: 24
End: 2024-05-10

## 2024-05-10 PROBLEM — Z59.41 FOOD INSECURITY: Status: ACTIVE | Noted: 2024-05-10

## 2024-05-10 PROBLEM — M25.512 LEFT SHOULDER PAIN: Status: ACTIVE | Noted: 2020-11-10

## 2024-05-10 PROBLEM — Z98.84 BARIATRIC SURGERY STATUS: Status: ACTIVE | Noted: 2023-03-29

## 2024-05-10 LAB
25(OH)D3 SERPL-MCNC: 12.8 NG/ML
ALBUMIN SERPL ELPH-MCNC: 4.9 G/DL
ALP BLD-CCNC: 88 U/L
ALT SERPL-CCNC: 9 U/L
ANION GAP SERPL CALC-SCNC: 9 MMOL/L
AST SERPL-CCNC: 14 U/L
BASOPHILS # BLD AUTO: 0.02 K/UL
BASOPHILS NFR BLD AUTO: 0.4 %
BILIRUB SERPL-MCNC: 0.9 MG/DL
BUN SERPL-MCNC: 8 MG/DL
CALCIUM SERPL-MCNC: 9.9 MG/DL
CHLORIDE SERPL-SCNC: 102 MMOL/L
CHOLEST SERPL-MCNC: 147 MG/DL
CO2 SERPL-SCNC: 29 MMOL/L
CREAT SERPL-MCNC: 1.13 MG/DL
EGFR: 93 ML/MIN/1.73M2
EOSINOPHIL # BLD AUTO: 0.11 K/UL
EOSINOPHIL NFR BLD AUTO: 2.1 %
ESTIMATED AVERAGE GLUCOSE: 103 MG/DL
FERRITIN SERPL-MCNC: 153 NG/ML
FOLATE SERPL-MCNC: 5 NG/ML
GLUCOSE SERPL-MCNC: 89 MG/DL
HBA1C MFR BLD HPLC: 5.2 %
HCT VFR BLD CALC: 48.8 %
HDLC SERPL-MCNC: 50 MG/DL
HGB BLD-MCNC: 17 G/DL
IMM GRANULOCYTES NFR BLD AUTO: 0.4 %
IRON SATN MFR SERPL: 30 %
IRON SERPL-MCNC: 84 UG/DL
LDLC SERPL CALC-MCNC: 78 MG/DL
LYMPHOCYTES # BLD AUTO: 1.55 K/UL
LYMPHOCYTES NFR BLD AUTO: 29.6 %
MAN DIFF?: NORMAL
MCHC RBC-ENTMCNC: 28.8 PG
MCHC RBC-ENTMCNC: 34.8 GM/DL
MCV RBC AUTO: 82.7 FL
MONOCYTES # BLD AUTO: 0.27 K/UL
MONOCYTES NFR BLD AUTO: 5.2 %
NEUTROPHILS # BLD AUTO: 3.26 K/UL
NEUTROPHILS NFR BLD AUTO: 62.3 %
NONHDLC SERPL-MCNC: 97 MG/DL
PLATELET # BLD AUTO: 205 K/UL
POTASSIUM SERPL-SCNC: 4.4 MMOL/L
PROT SERPL-MCNC: 7.2 G/DL
RBC # BLD: 5.9 M/UL
RBC # FLD: 13.7 %
SODIUM SERPL-SCNC: 140 MMOL/L
TIBC SERPL-MCNC: 279 UG/DL
TRIGL SERPL-MCNC: 101 MG/DL
UIBC SERPL-MCNC: 194 UG/DL
VIT B12 SERPL-MCNC: 562 PG/ML
WBC # FLD AUTO: 5.23 K/UL

## 2024-05-10 NOTE — REVIEW OF SYSTEMS
[Recent Change In Weight] : ~T recent weight change [Muscle Pain] : muscle pain [Fever] : no fever [Chills] : no chills [Chest Pain] : no chest pain [Palpitations] : no palpitations [Shortness Of Breath] : no shortness of breath [Cough] : no cough [Abdominal Pain] : no abdominal pain [Nausea] : no nausea [Constipation] : no constipation [Diarrhea] : no diarrhea [Vomiting] : no vomiting [Dysuria] : no dysuria [Back Pain] : no back pain [FreeTextEntry2] : Decreased appetite [FreeTextEntry9] : left shoulder crepitus

## 2024-05-10 NOTE — PHYSICAL EXAM
[No Acute Distress] : no acute distress [Well Developed] : well developed [Well-Appearing] : well-appearing [Normal Sclera/Conjunctiva] : normal sclera/conjunctiva [PERRL] : pupils equal round and reactive to light [EOMI] : extraocular movements intact [Normal Outer Ear/Nose] : the outer ears and nose were normal in appearance [Normal Oropharynx] : the oropharynx was normal [Supple] : supple [No Respiratory Distress] : no respiratory distress  [No Accessory Muscle Use] : no accessory muscle use [Clear to Auscultation] : lungs were clear to auscultation bilaterally [Regular Rhythm] : with a regular rhythm [Normal Rate] : normal rate  [Normal S1, S2] : normal S1 and S2 [No Murmur] : no murmur heard [No Edema] : there was no peripheral edema [No Extremity Clubbing/Cyanosis] : no extremity clubbing/cyanosis [Soft] : abdomen soft [Non Tender] : non-tender [Non-distended] : non-distended [Normal Bowel Sounds] : normal bowel sounds [No CVA Tenderness] : no CVA  tenderness [No Spinal Tenderness] : no spinal tenderness [Grossly Normal Strength/Tone] : grossly normal strength/tone [No Rash] : no rash [Coordination Grossly Intact] : coordination grossly intact [No Focal Deficits] : no focal deficits [Normal Gait] : normal gait [Normal Affect] : the affect was normal [Alert and Oriented x3] : oriented to person, place, and time [Normal Insight/Judgement] : insight and judgment were intact [de-identified] : Obese [de-identified] : No w/r/r [de-identified] : Pain on flexion and lift off test and Hdez-Misael test
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
## 2024-05-10 NOTE — ASSESSMENT
[FreeTextEntry1] : Mr Boris Sánchez is a 23 year old male with obesity (s/p gastric sleeve June 2023) who is presenting for left shoulder pain, found to have food insecurity

## 2024-05-14 ENCOUNTER — NON-APPOINTMENT (OUTPATIENT)
Age: 24
End: 2024-05-14

## 2024-05-14 LAB — VIT A SERPL-MCNC: 48 UG/DL

## 2024-05-21 DIAGNOSIS — Z98.84 BARIATRIC SURGERY STATUS: ICD-10-CM

## 2024-05-21 DIAGNOSIS — Z59.41 FOOD INSECURITY: ICD-10-CM

## 2024-05-21 DIAGNOSIS — M25.512 PAIN IN LEFT SHOULDER: ICD-10-CM

## 2024-05-21 SDOH — ECONOMIC STABILITY - FOOD INSECURITY: FOOD INSECURITY: Z59.41

## 2024-06-24 ENCOUNTER — APPOINTMENT (OUTPATIENT)
Dept: SURGERY | Facility: CLINIC | Age: 24
End: 2024-06-24

## 2025-04-15 ENCOUNTER — OUTPATIENT (OUTPATIENT)
Dept: OUTPATIENT SERVICES | Facility: HOSPITAL | Age: 25
LOS: 1 days | End: 2025-04-15
Payer: MEDICAID

## 2025-04-15 ENCOUNTER — APPOINTMENT (OUTPATIENT)
Dept: INTERNAL MEDICINE | Facility: CLINIC | Age: 25
End: 2025-04-15
Payer: MEDICAID

## 2025-04-15 VITALS
BODY MASS INDEX: 32.08 KG/M2 | OXYGEN SATURATION: 97 % | DIASTOLIC BLOOD PRESSURE: 70 MMHG | HEIGHT: 74 IN | WEIGHT: 250 LBS | HEART RATE: 77 BPM | SYSTOLIC BLOOD PRESSURE: 120 MMHG

## 2025-04-15 DIAGNOSIS — Z00.00 ENCOUNTER FOR GENERAL ADULT MEDICAL EXAMINATION W/OUT ABNORMAL FINDINGS: ICD-10-CM

## 2025-04-15 DIAGNOSIS — Z11.3 ENCOUNTER FOR SCREENING FOR INFECTIONS WITH A PREDOMINANTLY SEXUAL MODE OF TRANSMISSION: ICD-10-CM

## 2025-04-15 DIAGNOSIS — Z98.890 OTHER SPECIFIED POSTPROCEDURAL STATES: Chronic | ICD-10-CM

## 2025-04-15 DIAGNOSIS — R73.03 PREDIABETES.: ICD-10-CM

## 2025-04-15 DIAGNOSIS — E55.9 VITAMIN D DEFICIENCY, UNSPECIFIED: ICD-10-CM

## 2025-04-15 DIAGNOSIS — R53.83 OTHER FATIGUE: ICD-10-CM

## 2025-04-15 DIAGNOSIS — F41.9 ANXIETY DISORDER, UNSPECIFIED: ICD-10-CM

## 2025-04-15 DIAGNOSIS — Z23 ENCOUNTER FOR IMMUNIZATION: ICD-10-CM

## 2025-04-15 PROCEDURE — G0463: CPT

## 2025-04-15 PROCEDURE — 99395 PREV VISIT EST AGE 18-39: CPT

## 2025-04-15 RX ORDER — ELECTROLYTES/DEXTROSE
SOLUTION, ORAL ORAL
Qty: 90 | Refills: 3 | Status: ACTIVE | COMMUNITY
Start: 2025-04-15 | End: 1900-01-01

## 2025-04-15 RX ORDER — HYDROXYZINE HYDROCHLORIDE 25 MG/1
25 TABLET ORAL
Qty: 60 | Refills: 0 | Status: ACTIVE | COMMUNITY
Start: 2025-04-15 | End: 1900-01-01

## 2025-04-17 LAB
25(OH)D3 SERPL-MCNC: 15.5 NG/ML
ALBUMIN SERPL ELPH-MCNC: 4.6 G/DL
ALP BLD-CCNC: 78 U/L
ALT SERPL-CCNC: 12 U/L
ANION GAP SERPL CALC-SCNC: 8 MMOL/L
AST SERPL-CCNC: 14 U/L
BASOPHILS # BLD AUTO: 0.02 K/UL
BASOPHILS NFR BLD AUTO: 0.4 %
BILIRUB SERPL-MCNC: 0.4 MG/DL
BUN SERPL-MCNC: 10 MG/DL
C TRACH RRNA SPEC QL NAA+PROBE: NOT DETECTED
CALCIUM SERPL-MCNC: 9.2 MG/DL
CHLORIDE SERPL-SCNC: 105 MMOL/L
CHOLEST SERPL-MCNC: 139 MG/DL
CO2 SERPL-SCNC: 28 MMOL/L
CREAT SERPL-MCNC: 1.04 MG/DL
EGFRCR SERPLBLD CKD-EPI 2021: 102 ML/MIN/1.73M2
EOSINOPHIL # BLD AUTO: 0.11 K/UL
EOSINOPHIL NFR BLD AUTO: 2.3 %
ESTIMATED AVERAGE GLUCOSE: 100 MG/DL
FOLATE SERPL-MCNC: 5.5 NG/ML
GLUCOSE SERPL-MCNC: 52 MG/DL
HBA1C MFR BLD HPLC: 5.1 %
HBV CORE IGG+IGM SER QL: NONREACTIVE
HBV SURFACE AB SER QL: NONREACTIVE
HBV SURFACE AG SER QL: NONREACTIVE
HCT VFR BLD CALC: 50.8 %
HCV AB SER QL: NONREACTIVE
HCV S/CO RATIO: 0.11 S/CO
HDLC SERPL-MCNC: 50 MG/DL
HGB BLD-MCNC: 16.8 G/DL
HIV1+2 AB SPEC QL IA.RAPID: NONREACTIVE
IMM GRANULOCYTES NFR BLD AUTO: 0.2 %
LDLC SERPL-MCNC: 54 MG/DL
LYMPHOCYTES # BLD AUTO: 1.54 K/UL
LYMPHOCYTES NFR BLD AUTO: 31.6 %
MAN DIFF?: NORMAL
MCHC RBC-ENTMCNC: 28.7 PG
MCHC RBC-ENTMCNC: 33.1 G/DL
MCV RBC AUTO: 86.7 FL
MONOCYTES # BLD AUTO: 0.34 K/UL
MONOCYTES NFR BLD AUTO: 7 %
N GONORRHOEA RRNA SPEC QL NAA+PROBE: NOT DETECTED
NEUTROPHILS # BLD AUTO: 2.85 K/UL
NEUTROPHILS NFR BLD AUTO: 58.5 %
NONHDLC SERPL-MCNC: 89 MG/DL
PLATELET # BLD AUTO: 228 K/UL
POTASSIUM SERPL-SCNC: 4.7 MMOL/L
PROT SERPL-MCNC: 6.7 G/DL
RBC # BLD: 5.86 M/UL
RBC # FLD: 13.6 %
SODIUM SERPL-SCNC: 141 MMOL/L
SOURCE AMPLIFICATION: NORMAL
T PALLIDUM AB SER QL IA: NEGATIVE
TRIGL SERPL-MCNC: 217 MG/DL
TSH SERPL-ACNC: 1.08 UIU/ML
VIT B12 SERPL-MCNC: 521 PG/ML
WBC # FLD AUTO: 4.87 K/UL

## 2025-04-18 ENCOUNTER — APPOINTMENT (OUTPATIENT)
Dept: INTERNAL MEDICINE | Facility: CLINIC | Age: 25
End: 2025-04-18

## 2025-04-18 DIAGNOSIS — I10 ESSENTIAL (PRIMARY) HYPERTENSION: ICD-10-CM

## 2025-04-22 DIAGNOSIS — R53.83 OTHER FATIGUE: ICD-10-CM

## 2025-04-22 DIAGNOSIS — R73.03 PREDIABETES: ICD-10-CM

## 2025-04-22 DIAGNOSIS — Z11.3 ENCOUNTER FOR SCREENING FOR INFECTIONS WITH A PREDOMINANTLY SEXUAL MODE OF TRANSMISSION: ICD-10-CM

## 2025-04-22 DIAGNOSIS — E55.9 VITAMIN D DEFICIENCY, UNSPECIFIED: ICD-10-CM

## 2025-04-22 DIAGNOSIS — Z00.00 ENCOUNTER FOR GENERAL ADULT MEDICAL EXAMINATION WITHOUT ABNORMAL FINDINGS: ICD-10-CM

## 2025-04-22 DIAGNOSIS — F41.9 ANXIETY DISORDER, UNSPECIFIED: ICD-10-CM

## 2025-05-23 ENCOUNTER — RX RENEWAL (OUTPATIENT)
Age: 25
End: 2025-05-23

## 2025-06-23 ENCOUNTER — APPOINTMENT (OUTPATIENT)
Dept: SURGERY | Facility: CLINIC | Age: 25
End: 2025-06-23
Payer: MEDICAID

## 2025-06-23 VITALS
HEIGHT: 74 IN | SYSTOLIC BLOOD PRESSURE: 130 MMHG | OXYGEN SATURATION: 99 % | DIASTOLIC BLOOD PRESSURE: 85 MMHG | RESPIRATION RATE: 16 BRPM | WEIGHT: 240.13 LBS | HEART RATE: 69 BPM | TEMPERATURE: 97.3 F | BODY MASS INDEX: 30.82 KG/M2

## 2025-06-23 PROCEDURE — 99214 OFFICE O/P EST MOD 30 MIN: CPT

## 2025-07-21 ENCOUNTER — APPOINTMENT (OUTPATIENT)
Dept: INTERNAL MEDICINE | Facility: CLINIC | Age: 25
End: 2025-07-21

## (undated) DEVICE — LIGASURE MARYLAND 44CM

## (undated) DEVICE — SUT POLYSORB 0 36" GU-46

## (undated) DEVICE — DRAPE TOWEL BLUE 17" X 24"

## (undated) DEVICE — DRSG STERISTRIPS 0.5 X 4"

## (undated) DEVICE — GLV 8 PROTEXIS (WHITE)

## (undated) DEVICE — SUT POLYSORB 3-0 30" V-20 UNDYED

## (undated) DEVICE — PACK ADVANCED LAPAROSCOPIC NS

## (undated) DEVICE — SUT ETHIBOND 0 44" EN3

## (undated) DEVICE — TROCAR COVIDIEN VERSAPORT BLADELESS OPTICAL 5MM STANDARD

## (undated) DEVICE — GOWN XL EXTRA LONG

## (undated) DEVICE — TUBING STRYKEFLOW II SUCTION / IRRIGATOR

## (undated) DEVICE — TROCAR COVIDIEN VERSASTEP PLUS 15MM STANDARD

## (undated) DEVICE — COVIDIEN SIGNIA POWER CONTROL SHELL

## (undated) DEVICE — DRAPE 1/2 SHEET 40X57"

## (undated) DEVICE — GOWN TRIMAX LG

## (undated) DEVICE — INSUFFLATION NDL COVIDIEN STEP 14G FOR STEP/VERSASTEP

## (undated) DEVICE — D HELP - CLEARVIEW CLEARIFY SYSTEM

## (undated) DEVICE — DRSG MASTISOL

## (undated) DEVICE — SPECIMEN CONTAINER 100ML

## (undated) DEVICE — WARMING BLANKET UPPER ADULT

## (undated) DEVICE — IRRIGATION TRAY W PISTON SYRINGE 60ML

## (undated) DEVICE — SOL IRR POUR H2O 250ML

## (undated) DEVICE — SHEARS COVIDIEN ENDO SHEAR 5MM X 45CM LONG W MONOPOLAR CAUTERY

## (undated) DEVICE — ENDOCATCH II 15MM

## (undated) DEVICE — DRAPE INSTRUMENT POUCH 6.75" X 11"

## (undated) DEVICE — TUBING INSUFFLATION LAP FILTER 10FT

## (undated) DEVICE — SUT BIOSYN 4-0 18" P-12

## (undated) DEVICE — VENODYNE/SCD SLEEVE CALF LARGE

## (undated) DEVICE — DRAPE MAYO STAND 30"

## (undated) DEVICE — MEDICATION LABELS W MARKER

## (undated) DEVICE — PREP CHLORAPREP HI-LITE ORANGE 26ML

## (undated) DEVICE — POSITIONER FOAM EGG CRATE ULNAR 2PCS (PINK)

## (undated) DEVICE — TROCAR COVIDIEN VERSASTEP PLUS 12MM STANDARD

## (undated) DEVICE — TAPE SILK 3"

## (undated) DEVICE — VISIGI 3D SLEEVE GASTRECTOMY 36FR

## (undated) DEVICE — SOL IRR POUR NS 0.9% 500ML